# Patient Record
Sex: FEMALE | Race: WHITE | Employment: OTHER | URBAN - NONMETROPOLITAN AREA
[De-identification: names, ages, dates, MRNs, and addresses within clinical notes are randomized per-mention and may not be internally consistent; named-entity substitution may affect disease eponyms.]

---

## 2018-08-22 ENCOUNTER — HOSPITAL ENCOUNTER (OUTPATIENT)
Dept: PREADMISSION TESTING | Age: 63
Discharge: HOME | End: 2018-08-26
Payer: COMMERCIAL

## 2018-08-22 VITALS — HEIGHT: 65 IN | WEIGHT: 155 LBS | BODY MASS INDEX: 25.83 KG/M2

## 2018-08-22 LAB
ANION GAP SERPL CALCULATED.3IONS-SCNC: 12 MMOL/L (ref 7–19)
BASOPHILS ABSOLUTE: 0.1 K/UL (ref 0–0.2)
BASOPHILS RELATIVE PERCENT: 0.7 % (ref 0–1)
BUN BLDV-MCNC: 16 MG/DL (ref 8–23)
CALCIUM SERPL-MCNC: 9.4 MG/DL (ref 8.8–10.2)
CHLORIDE BLD-SCNC: 103 MMOL/L (ref 98–111)
CO2: 27 MMOL/L (ref 22–29)
CREAT SERPL-MCNC: 0.9 MG/DL (ref 0.5–0.9)
EKG P AXIS: 61 DEGREES
EKG P-R INTERVAL: 156 MS
EKG Q-T INTERVAL: 402 MS
EKG QRS DURATION: 96 MS
EKG QTC CALCULATION (BAZETT): 416 MS
EKG T AXIS: 33 DEGREES
EOSINOPHILS ABSOLUTE: 0.1 K/UL (ref 0–0.6)
EOSINOPHILS RELATIVE PERCENT: 1.1 % (ref 0–5)
GFR NON-AFRICAN AMERICAN: >60
GLUCOSE BLD-MCNC: 64 MG/DL (ref 74–109)
HCT VFR BLD CALC: 43.4 % (ref 37–47)
HEMOGLOBIN: 13.5 G/DL (ref 12–16)
LYMPHOCYTES ABSOLUTE: 1.4 K/UL (ref 1.1–4.5)
LYMPHOCYTES RELATIVE PERCENT: 18.1 % (ref 20–40)
MCH RBC QN AUTO: 29.7 PG (ref 27–31)
MCHC RBC AUTO-ENTMCNC: 31.1 G/DL (ref 33–37)
MCV RBC AUTO: 95.4 FL (ref 81–99)
MONOCYTES ABSOLUTE: 0.6 K/UL (ref 0–0.9)
MONOCYTES RELATIVE PERCENT: 8.2 % (ref 0–10)
NEUTROPHILS ABSOLUTE: 5.3 K/UL (ref 1.5–7.5)
NEUTROPHILS RELATIVE PERCENT: 71.4 % (ref 50–65)
PDW BLD-RTO: 14.1 % (ref 11.5–14.5)
PLATELET # BLD: 235 K/UL (ref 130–400)
PMV BLD AUTO: 11.1 FL (ref 9.4–12.3)
POTASSIUM SERPL-SCNC: 4.1 MMOL/L (ref 3.5–5)
RBC # BLD: 4.55 M/UL (ref 4.2–5.4)
SODIUM BLD-SCNC: 142 MMOL/L (ref 136–145)
WBC # BLD: 7.5 K/UL (ref 4.8–10.8)

## 2018-08-22 PROCEDURE — 80048 BASIC METABOLIC PNL TOTAL CA: CPT

## 2018-08-22 PROCEDURE — 93005 ELECTROCARDIOGRAM TRACING: CPT

## 2018-08-22 PROCEDURE — 85025 COMPLETE CBC W/AUTO DIFF WBC: CPT

## 2018-08-22 RX ORDER — FLUTICASONE PROPIONATE 50 MCG
1 SPRAY, SUSPENSION (ML) NASAL DAILY
COMMUNITY
End: 2022-10-20 | Stop reason: ALTCHOICE

## 2018-08-22 RX ORDER — ESTRADIOL 0.5 MG/1
0.5 TABLET ORAL DAILY
COMMUNITY

## 2018-08-22 RX ORDER — SERTRALINE HYDROCHLORIDE 100 MG/1
100 TABLET, FILM COATED ORAL DAILY
Status: ON HOLD | COMMUNITY
End: 2018-08-31

## 2018-08-22 RX ORDER — M-VIT,TX,IRON,MINS/CALC/FOLIC 27MG-0.4MG
1 TABLET ORAL DAILY
COMMUNITY

## 2018-08-22 RX ORDER — OMEPRAZOLE 20 MG/1
20 CAPSULE, DELAYED RELEASE ORAL DAILY
COMMUNITY
End: 2022-10-20 | Stop reason: ALTCHOICE

## 2018-08-22 RX ORDER — LEVOTHYROXINE SODIUM 88 UG/1
88 TABLET ORAL DAILY
COMMUNITY

## 2018-08-22 RX ORDER — RANITIDINE 150 MG/1
150 TABLET ORAL NIGHTLY
COMMUNITY
End: 2022-10-20 | Stop reason: ALTCHOICE

## 2018-08-31 ENCOUNTER — ANESTHESIA (OUTPATIENT)
Dept: OPERATING ROOM | Age: 63
End: 2018-08-31
Payer: COMMERCIAL

## 2018-08-31 ENCOUNTER — HOSPITAL ENCOUNTER (OUTPATIENT)
Age: 63
Setting detail: OUTPATIENT SURGERY
Discharge: HOME | End: 2018-08-31
Attending: ORTHOPAEDIC SURGERY | Admitting: ORTHOPAEDIC SURGERY
Payer: COMMERCIAL

## 2018-08-31 ENCOUNTER — ANESTHESIA EVENT (OUTPATIENT)
Dept: OPERATING ROOM | Age: 63
End: 2018-08-31
Payer: COMMERCIAL

## 2018-08-31 VITALS
OXYGEN SATURATION: 95 % | SYSTOLIC BLOOD PRESSURE: 125 MMHG | HEART RATE: 95 BPM | WEIGHT: 155 LBS | TEMPERATURE: 97 F | RESPIRATION RATE: 16 BRPM | BODY MASS INDEX: 25.83 KG/M2 | DIASTOLIC BLOOD PRESSURE: 63 MMHG | HEIGHT: 65 IN

## 2018-08-31 VITALS
DIASTOLIC BLOOD PRESSURE: 58 MMHG | TEMPERATURE: 96.9 F | OXYGEN SATURATION: 100 % | SYSTOLIC BLOOD PRESSURE: 119 MMHG | RESPIRATION RATE: 13 BRPM

## 2018-08-31 DIAGNOSIS — M75.121 COMPLETE TEAR OF RIGHT ROTATOR CUFF: Primary | ICD-10-CM

## 2018-08-31 PROCEDURE — 6370000000 HC RX 637 (ALT 250 FOR IP): Performed by: ANESTHESIOLOGY

## 2018-08-31 PROCEDURE — 3600000014 HC SURGERY LEVEL 4 ADDTL 15MIN: Performed by: ORTHOPAEDIC SURGERY

## 2018-08-31 PROCEDURE — 6360000002 HC RX W HCPCS: Performed by: NURSE ANESTHETIST, CERTIFIED REGISTERED

## 2018-08-31 PROCEDURE — 7100000011 HC PHASE II RECOVERY - ADDTL 15 MIN: Performed by: ORTHOPAEDIC SURGERY

## 2018-08-31 PROCEDURE — 2580000003 HC RX 258: Performed by: ORTHOPAEDIC SURGERY

## 2018-08-31 PROCEDURE — 3600000004 HC SURGERY LEVEL 4 BASE: Performed by: ORTHOPAEDIC SURGERY

## 2018-08-31 PROCEDURE — 6360000002 HC RX W HCPCS: Performed by: ORTHOPAEDIC SURGERY

## 2018-08-31 PROCEDURE — 2500000003 HC RX 250 WO HCPCS: Performed by: NURSE ANESTHETIST, CERTIFIED REGISTERED

## 2018-08-31 PROCEDURE — 3700000000 HC ANESTHESIA ATTENDED CARE: Performed by: ORTHOPAEDIC SURGERY

## 2018-08-31 PROCEDURE — 64415 NJX AA&/STRD BRCH PLXS IMG: CPT | Performed by: NURSE ANESTHETIST, CERTIFIED REGISTERED

## 2018-08-31 PROCEDURE — L3650 SO 8 ABD RESTRAINT PRE OTS: HCPCS | Performed by: ORTHOPAEDIC SURGERY

## 2018-08-31 PROCEDURE — 7100000010 HC PHASE II RECOVERY - FIRST 15 MIN: Performed by: ORTHOPAEDIC SURGERY

## 2018-08-31 PROCEDURE — 7100000001 HC PACU RECOVERY - ADDTL 15 MIN: Performed by: ORTHOPAEDIC SURGERY

## 2018-08-31 PROCEDURE — 2720000010 HC SURG SUPPLY STERILE: Performed by: ORTHOPAEDIC SURGERY

## 2018-08-31 PROCEDURE — 2709999900 HC NON-CHARGEABLE SUPPLY: Performed by: ORTHOPAEDIC SURGERY

## 2018-08-31 PROCEDURE — C1713 ANCHOR/SCREW BN/BN,TIS/BN: HCPCS | Performed by: ORTHOPAEDIC SURGERY

## 2018-08-31 PROCEDURE — 6360000002 HC RX W HCPCS

## 2018-08-31 PROCEDURE — 6360000002 HC RX W HCPCS: Performed by: ANESTHESIOLOGY

## 2018-08-31 PROCEDURE — 3700000001 HC ADD 15 MINUTES (ANESTHESIA): Performed by: ORTHOPAEDIC SURGERY

## 2018-08-31 PROCEDURE — 7100000000 HC PACU RECOVERY - FIRST 15 MIN: Performed by: ORTHOPAEDIC SURGERY

## 2018-08-31 DEVICE — SYSTEM IMPL INCL 2 4.75MM BIOCOMPOSITE SWIVELOCK C ANCHR: Type: IMPLANTABLE DEVICE | Site: SHOULDER | Status: FUNCTIONAL

## 2018-08-31 RX ORDER — ASPIRIN 325 MG
325 TABLET, DELAYED RELEASE (ENTERIC COATED) ORAL 2 TIMES DAILY
Qty: 42 TABLET | Refills: 0 | Status: ON HOLD | OUTPATIENT
Start: 2018-08-31 | End: 2019-04-16

## 2018-08-31 RX ORDER — MIDAZOLAM HYDROCHLORIDE 1 MG/ML
2 INJECTION INTRAMUSCULAR; INTRAVENOUS
Status: COMPLETED | OUTPATIENT
Start: 2018-08-31 | End: 2018-08-31

## 2018-08-31 RX ORDER — LABETALOL HYDROCHLORIDE 5 MG/ML
5 INJECTION, SOLUTION INTRAVENOUS EVERY 10 MIN PRN
Status: DISCONTINUED | OUTPATIENT
Start: 2018-08-31 | End: 2018-08-31 | Stop reason: HOSPADM

## 2018-08-31 RX ORDER — SCOLOPAMINE TRANSDERMAL SYSTEM 1 MG/1
1 PATCH, EXTENDED RELEASE TRANSDERMAL ONCE
Status: DISCONTINUED | OUTPATIENT
Start: 2018-08-31 | End: 2018-08-31 | Stop reason: HOSPADM

## 2018-08-31 RX ORDER — OXYCODONE HYDROCHLORIDE 5 MG/1
5 TABLET ORAL
Status: DISCONTINUED | OUTPATIENT
Start: 2018-08-31 | End: 2018-08-31 | Stop reason: HOSPADM

## 2018-08-31 RX ORDER — SODIUM CHLORIDE, SODIUM LACTATE, POTASSIUM CHLORIDE, CALCIUM CHLORIDE 600; 310; 30; 20 MG/100ML; MG/100ML; MG/100ML; MG/100ML
INJECTION, SOLUTION INTRAVENOUS CONTINUOUS
Status: DISCONTINUED | OUTPATIENT
Start: 2018-08-31 | End: 2018-08-31 | Stop reason: HOSPADM

## 2018-08-31 RX ORDER — PROMETHAZINE HYDROCHLORIDE 25 MG/ML
6.25 INJECTION, SOLUTION INTRAMUSCULAR; INTRAVENOUS
Status: DISCONTINUED | OUTPATIENT
Start: 2018-08-31 | End: 2018-08-31 | Stop reason: HOSPADM

## 2018-08-31 RX ORDER — ONDANSETRON 4 MG/1
4 TABLET, FILM COATED ORAL DAILY PRN
Qty: 20 TABLET | Refills: 0 | Status: ON HOLD | OUTPATIENT
Start: 2018-08-31 | End: 2019-04-16 | Stop reason: ALTCHOICE

## 2018-08-31 RX ORDER — HYDROMORPHONE HCL IN 0.9% NACL 0.5 MG/ML
0.25 SYRINGE (ML) INTRAVENOUS EVERY 5 MIN PRN
Status: DISCONTINUED | OUTPATIENT
Start: 2018-08-31 | End: 2018-08-31 | Stop reason: HOSPADM

## 2018-08-31 RX ORDER — DEXAMETHASONE SODIUM PHOSPHATE 10 MG/ML
INJECTION INTRAMUSCULAR; INTRAVENOUS PRN
Status: DISCONTINUED | OUTPATIENT
Start: 2018-08-31 | End: 2018-08-31 | Stop reason: SDUPTHER

## 2018-08-31 RX ORDER — APREPITANT 40 MG/1
40 CAPSULE ORAL ONCE
Status: COMPLETED | OUTPATIENT
Start: 2018-08-31 | End: 2018-08-31

## 2018-08-31 RX ORDER — FENTANYL CITRATE 50 UG/ML
INJECTION, SOLUTION INTRAMUSCULAR; INTRAVENOUS PRN
Status: DISCONTINUED | OUTPATIENT
Start: 2018-08-31 | End: 2018-08-31 | Stop reason: SDUPTHER

## 2018-08-31 RX ORDER — MIDAZOLAM HYDROCHLORIDE 1 MG/ML
INJECTION INTRAMUSCULAR; INTRAVENOUS
Status: COMPLETED
Start: 2018-08-31 | End: 2018-08-31

## 2018-08-31 RX ORDER — ROCURONIUM BROMIDE 10 MG/ML
INJECTION, SOLUTION INTRAVENOUS PRN
Status: DISCONTINUED | OUTPATIENT
Start: 2018-08-31 | End: 2018-08-31 | Stop reason: SDUPTHER

## 2018-08-31 RX ORDER — METOCLOPRAMIDE HYDROCHLORIDE 5 MG/ML
10 INJECTION INTRAMUSCULAR; INTRAVENOUS
Status: DISCONTINUED | OUTPATIENT
Start: 2018-08-31 | End: 2018-08-31 | Stop reason: HOSPADM

## 2018-08-31 RX ORDER — HYDROMORPHONE HCL IN 0.9% NACL 0.5 MG/ML
0.5 SYRINGE (ML) INTRAVENOUS EVERY 5 MIN PRN
Status: DISCONTINUED | OUTPATIENT
Start: 2018-08-31 | End: 2018-08-31 | Stop reason: HOSPADM

## 2018-08-31 RX ORDER — EPHEDRINE SULFATE 50 MG/ML
INJECTION, SOLUTION INTRAVENOUS PRN
Status: DISCONTINUED | OUTPATIENT
Start: 2018-08-31 | End: 2018-08-31 | Stop reason: SDUPTHER

## 2018-08-31 RX ORDER — OXYCODONE HYDROCHLORIDE AND ACETAMINOPHEN 5; 325 MG/1; MG/1
1-2 TABLET ORAL EVERY 4 HOURS PRN
Qty: 60 TABLET | Refills: 0 | Status: SHIPPED | OUTPATIENT
Start: 2018-08-31 | End: 2018-09-07

## 2018-08-31 RX ORDER — FENTANYL CITRATE 50 UG/ML
50 INJECTION, SOLUTION INTRAMUSCULAR; INTRAVENOUS
Status: DISCONTINUED | OUTPATIENT
Start: 2018-08-31 | End: 2018-08-31 | Stop reason: HOSPADM

## 2018-08-31 RX ORDER — PROPOFOL 10 MG/ML
INJECTION, EMULSION INTRAVENOUS PRN
Status: DISCONTINUED | OUTPATIENT
Start: 2018-08-31 | End: 2018-08-31 | Stop reason: SDUPTHER

## 2018-08-31 RX ORDER — DIPHENHYDRAMINE HYDROCHLORIDE 50 MG/ML
12.5 INJECTION INTRAMUSCULAR; INTRAVENOUS
Status: DISCONTINUED | OUTPATIENT
Start: 2018-08-31 | End: 2018-08-31 | Stop reason: HOSPADM

## 2018-08-31 RX ORDER — DOCUSATE SODIUM 100 MG/1
100 CAPSULE, LIQUID FILLED ORAL 2 TIMES DAILY
Qty: 60 CAPSULE | Refills: 1 | Status: ON HOLD | OUTPATIENT
Start: 2018-08-31 | End: 2019-04-16 | Stop reason: ALTCHOICE

## 2018-08-31 RX ORDER — MEPERIDINE HYDROCHLORIDE 50 MG/ML
12.5 INJECTION INTRAMUSCULAR; INTRAVENOUS; SUBCUTANEOUS EVERY 5 MIN PRN
Status: DISCONTINUED | OUTPATIENT
Start: 2018-08-31 | End: 2018-08-31 | Stop reason: HOSPADM

## 2018-08-31 RX ORDER — HYDRALAZINE HYDROCHLORIDE 20 MG/ML
5 INJECTION INTRAMUSCULAR; INTRAVENOUS EVERY 10 MIN PRN
Status: DISCONTINUED | OUTPATIENT
Start: 2018-08-31 | End: 2018-08-31 | Stop reason: HOSPADM

## 2018-08-31 RX ORDER — LIDOCAINE HYDROCHLORIDE 10 MG/ML
1 INJECTION, SOLUTION EPIDURAL; INFILTRATION; INTRACAUDAL; PERINEURAL
Status: DISCONTINUED | OUTPATIENT
Start: 2018-08-31 | End: 2018-08-31 | Stop reason: HOSPADM

## 2018-08-31 RX ORDER — SODIUM CHLORIDE 0.9 % (FLUSH) 0.9 %
10 SYRINGE (ML) INJECTION EVERY 12 HOURS SCHEDULED
Status: DISCONTINUED | OUTPATIENT
Start: 2018-08-31 | End: 2018-08-31 | Stop reason: HOSPADM

## 2018-08-31 RX ORDER — LIDOCAINE HYDROCHLORIDE 10 MG/ML
INJECTION, SOLUTION EPIDURAL; INFILTRATION; INTRACAUDAL; PERINEURAL PRN
Status: DISCONTINUED | OUTPATIENT
Start: 2018-08-31 | End: 2018-08-31 | Stop reason: SDUPTHER

## 2018-08-31 RX ORDER — MORPHINE SULFATE/0.9% NACL/PF 1 MG/ML
2 SYRINGE (ML) INJECTION EVERY 5 MIN PRN
Status: DISCONTINUED | OUTPATIENT
Start: 2018-08-31 | End: 2018-08-31 | Stop reason: HOSPADM

## 2018-08-31 RX ORDER — SODIUM CHLORIDE 0.9 % (FLUSH) 0.9 %
10 SYRINGE (ML) INJECTION PRN
Status: DISCONTINUED | OUTPATIENT
Start: 2018-08-31 | End: 2018-08-31 | Stop reason: HOSPADM

## 2018-08-31 RX ORDER — ONDANSETRON 2 MG/ML
INJECTION INTRAMUSCULAR; INTRAVENOUS PRN
Status: DISCONTINUED | OUTPATIENT
Start: 2018-08-31 | End: 2018-08-31 | Stop reason: SDUPTHER

## 2018-08-31 RX ORDER — MORPHINE SULFATE/0.9% NACL/PF 1 MG/ML
4 SYRINGE (ML) INJECTION EVERY 5 MIN PRN
Status: DISCONTINUED | OUTPATIENT
Start: 2018-08-31 | End: 2018-08-31 | Stop reason: HOSPADM

## 2018-08-31 RX ORDER — MORPHINE SULFATE 4 MG/ML
4 INJECTION, SOLUTION INTRAMUSCULAR; INTRAVENOUS
Status: DISCONTINUED | OUTPATIENT
Start: 2018-08-31 | End: 2018-08-31 | Stop reason: HOSPADM

## 2018-08-31 RX ADMIN — EPHEDRINE SULFATE 20 MG: 50 INJECTION, SOLUTION INTRAMUSCULAR; INTRAVENOUS; SUBCUTANEOUS at 08:19

## 2018-08-31 RX ADMIN — WATER 1 G: 1 INJECTION INTRAMUSCULAR; INTRAVENOUS; SUBCUTANEOUS at 08:23

## 2018-08-31 RX ADMIN — LIDOCAINE HYDROCHLORIDE 5 ML: 10 INJECTION, SOLUTION EPIDURAL; INFILTRATION; INTRACAUDAL; PERINEURAL at 08:08

## 2018-08-31 RX ADMIN — ROCURONIUM BROMIDE 40 MG: 10 INJECTION INTRAVENOUS at 08:08

## 2018-08-31 RX ADMIN — MIDAZOLAM HYDROCHLORIDE 2 MG: 2 INJECTION, SOLUTION INTRAMUSCULAR; INTRAVENOUS at 07:58

## 2018-08-31 RX ADMIN — PHENYLEPHRINE HYDROCHLORIDE 100 MCG: 10 INJECTION INTRAVENOUS at 08:19

## 2018-08-31 RX ADMIN — SODIUM CHLORIDE, POTASSIUM CHLORIDE, SODIUM LACTATE AND CALCIUM CHLORIDE: 600; 310; 30; 20 INJECTION, SOLUTION INTRAVENOUS at 07:10

## 2018-08-31 RX ADMIN — APREPITANT 40 MG: 40 CAPSULE ORAL at 07:09

## 2018-08-31 RX ADMIN — MIDAZOLAM HYDROCHLORIDE 2 MG: 1 INJECTION INTRAMUSCULAR; INTRAVENOUS at 07:58

## 2018-08-31 RX ADMIN — DEXAMETHASONE SODIUM PHOSPHATE 10 MG: 10 INJECTION INTRAMUSCULAR; INTRAVENOUS at 08:29

## 2018-08-31 RX ADMIN — PROPOFOL 150 MG: 10 INJECTION, EMULSION INTRAVENOUS at 08:08

## 2018-08-31 RX ADMIN — ONDANSETRON HYDROCHLORIDE 4 MG: 2 INJECTION, SOLUTION INTRAMUSCULAR; INTRAVENOUS at 08:29

## 2018-08-31 RX ADMIN — SUGAMMADEX 150 MG: 100 INJECTION, SOLUTION INTRAVENOUS at 09:51

## 2018-08-31 RX ADMIN — FENTANYL CITRATE 100 MCG: 50 INJECTION INTRAMUSCULAR; INTRAVENOUS at 08:08

## 2018-08-31 ASSESSMENT — PAIN DESCRIPTION - ORIENTATION: ORIENTATION: RIGHT

## 2018-08-31 ASSESSMENT — ENCOUNTER SYMPTOMS: SHORTNESS OF BREATH: 1

## 2018-08-31 ASSESSMENT — PAIN DESCRIPTION - LOCATION: LOCATION: SHOULDER

## 2018-08-31 ASSESSMENT — PAIN SCALES - GENERAL
PAINLEVEL_OUTOF10: 0

## 2018-08-31 ASSESSMENT — PAIN DESCRIPTION - PAIN TYPE: TYPE: SURGICAL PAIN

## 2018-08-31 ASSESSMENT — LIFESTYLE VARIABLES: SMOKING_STATUS: 0

## 2018-08-31 NOTE — ANESTHESIA POSTPROCEDURE EVALUATION
Department of Anesthesiology  Postprocedure Note    Patient: Melvi Sullivan  MRN: [id number]  YOB: 1955  Date of evaluation: 8/31/2018  Time:  10:07 AM     Procedure Summary     Date:  08/31/18 Room / Location:  [facility] OR  / [facility] OR    Anesthesia Start:  0805 Anesthesia Stop:  9267    Procedure:  SHOULDER ARTHROSCOPY ROTATOR CUFF REPAIR SUBACROMIAL DECOMPRESSION ACROMIOPLASTY DISTAL CLAVICLE EXCISION (Right Shoulder) Diagnosis:  (M75.121)    Surgeon:  Sanford Macdonald MD Responsible Provider:  MAY Grimaldo CRNA    Anesthesia Type:  general ASA Status:  3          Anesthesia Type: general    Reggie Phase I: Reggie Score: 10    Reggie Phase II:      Last vitals: Reviewed and per EMR flowsheets.        Anesthesia Post Evaluation    Patient location during evaluation: PACU  Patient participation: complete - patient participated  Level of consciousness: awake and alert  Pain score: 0  Airway patency: patent  Nausea & Vomiting: no vomiting and no nausea  Complications: no  Cardiovascular status: hemodynamically stable  Respiratory status: acceptable and room air  Hydration status: stable

## 2018-08-31 NOTE — OP NOTE
Patient Name: Fern Murphy  : 1955  MRN: [id number]    DATE of SURGERY: 2018    SURGEON: Tiffany Vazquez MD    ASSISTANT: NONE    PREOPERATIVE DIAGNOSIS:  1) recent atraumatic complete rotator cuff tear of the Right shoulder  2) Proximal biceps tendinitis, Right Shoulder    3) Subacromial impingement syndrome, Right Shoulder   4) Acromioclavicular joint primary osteoarthritis, Right Shoulder     POSTOPERATIVE DIAGNOSIS:  1) recent atraumatic complete rotator cuff tear of the Right shoulder  2) Proximal biceps tendinitis with labral fraying, Right Shoulder    3) Subacromial impingement syndrome, Right Shoulder   4) Acromioclavicular joint primary osteoarthritis, Right Shoulder     PROCEDURE PERFORMED:  1) Right Shoulder arthroscopic rotator cuff repair  2) Right Shoulder arthroscopic biceps tenotomy with labral debridement  3) Right Shoulder arthroscopic subacromial decompression  4) Right Shoulder arthroscopic distal clavicle excision    IMPLANTS: Arthrex 4.75 mm bioswivelock anchor    ANESTHESIA USED: General endotrachial anesthesia, interscalene block    ESTIMATED BLOOD LOSS: minimal    DRAINS: none     COMPLICATIONS: none    SPECIMENS: none    OPERATIVE INDICATIONS: This patient is a 60 y.o. female presented to my clinic with complaints of progressive shoulder pain without obvious traumatic injury to the shoulder. An MRI was obtained which showed the above named diagnoses. Pain was not relieved with conservative treatment consisting of anti-inflammatories, corticosteroid injections, physical therapy. Due to progressive pain and loss of function, surgical evaluation was discussed and the patient wished to proceed understanding risks, benefits, and alternatives. The surgical indication were to relieve pain, improve function, and prevent future disability in regards to the shoulder pathology dictated in the aboved diagnoses.   Risks included, but were not limited to, that of anesthesia, bleeding, infection, posterior anchor were then retrieved out the lateral portal and fixation was performed in knotless fashion the lateral aspect of the greater tuberosity with a knotless 4.75 bioswivelock anchor. An identical procedure was performed for the remaining sutures completing the repair. Portals were then closed with monocryl and a sterile dressing was applied followed by a sling. The patient was awakened from anesthesia, transported to the recovery room in stable condition.     POSTOP PLAN:    1) Discharge home with family  2) Follow up in 2 weeks for a clinical check  3) Follow the following protocol: Small/Medium RCR      Electronically signed by Андрей Calvert MD on 8/31/2018 at 8:19 AM

## 2018-08-31 NOTE — ANESTHESIA PRE PROCEDURE
Department of Anesthesiology  Preprocedure Note       Name:  Tonia Rodríguez   Age:  60 y.o.  :  1955                                          MRN:  [id number]         Date:  2018      Surgeon: Jillian Dickson):  Meryl López MD    Procedure: Procedure(s):  SHOULDER ARTHROSCOPY ROTATOR CUFF REPAIR SUBACROMIAL DECOMPRESSION ACROMIOPLASTY DISTAL CLAVICLE EXCISION    Medications prior to admission:   Prior to Admission medications    Medication Sig Start Date End Date Taking? Authorizing Provider   levothyroxine (SYNTHROID) 88 MCG tablet Take 88 mcg by mouth Daily    Historical Provider, MD   sertraline (ZOLOFT) 100 MG tablet Take 100 mg by mouth daily    Historical Provider, MD   estradiol (ESTRACE) 0.5 MG tablet Take 0.5 mg by mouth daily    Historical Provider, MD   omeprazole (PRILOSEC) 20 MG delayed release capsule Take 20 mg by mouth daily    Historical Provider, MD   ranitidine (ZANTAC) 150 MG tablet Take 150 mg by mouth nightly    Historical Provider, MD   Multiple Vitamins-Minerals (THERAPEUTIC MULTIVITAMIN-MINERALS) tablet Take 1 tablet by mouth daily    Historical Provider, MD   fluticasone (FLONASE) 50 MCG/ACT nasal spray 1 spray by Each Nare route daily    Historical Provider, MD       Current medications:    Current Facility-Administered Medications   Medication Dose Route Frequency Provider Last Rate Last Dose    ceFAZolin (ANCEF) 1 g in sterile water 10 mL IV syringe  1 g Intravenous Once Meryl López MD        lactated ringers infusion   Intravenous Continuous Meryl López MD           Allergies:     Allergies   Allergen Reactions    Dye [Iodides]      \"it will ruin my only kidney\"      Codeine Nausea And Vomiting       Problem List:    Patient Active Problem List   Diagnosis Code    Chest pain R07.9    SOB (shortness of breath) R06.02       Past Medical History:        Diagnosis Date    Anxiety     Arthritis     Chest pain 10/12/2011    hx. of    GERD (gastroesophageal reflux disease)    

## 2019-04-06 ENCOUNTER — TRANSCRIBE ORDERS (OUTPATIENT)
Dept: ADMINISTRATIVE | Facility: HOSPITAL | Age: 64
End: 2019-04-06

## 2019-04-06 DIAGNOSIS — Z01.812 PRE-OPERATIVE LABORATORY EXAMINATION: ICD-10-CM

## 2019-04-06 DIAGNOSIS — Z01.818 PREOP EXAMINATION: Primary | ICD-10-CM

## 2019-04-08 ENCOUNTER — APPOINTMENT (OUTPATIENT)
Dept: LAB | Facility: HOSPITAL | Age: 64
End: 2019-04-08

## 2019-04-08 ENCOUNTER — HOSPITAL ENCOUNTER (OUTPATIENT)
Dept: CARDIOLOGY | Facility: HOSPITAL | Age: 64
Discharge: HOME | End: 2019-04-08
Admitting: NURSE ANESTHETIST, CERTIFIED REGISTERED

## 2019-04-08 DIAGNOSIS — Z01.818 PREOP EXAMINATION: ICD-10-CM

## 2019-04-08 LAB
ALBUMIN SERPL-MCNC: 4.5 G/DL (ref 3.5–5)
ALBUMIN/GLOB SERPL: 1.6 G/DL (ref 1.1–2.5)
ALP SERPL-CCNC: 78 U/L (ref 24–120)
ALT SERPL W P-5'-P-CCNC: <15 U/L (ref 0–54)
ANION GAP SERPL CALCULATED.3IONS-SCNC: 9 MMOL/L (ref 4–13)
AST SERPL-CCNC: 26 U/L (ref 7–45)
BILIRUB SERPL-MCNC: 0.4 MG/DL (ref 0.1–1)
BUN BLD-MCNC: 18 MG/DL (ref 5–21)
BUN/CREAT SERPL: 18.8 (ref 7–25)
CALCIUM SPEC-SCNC: 9.6 MG/DL (ref 8.4–10.4)
CHLORIDE SERPL-SCNC: 105 MMOL/L (ref 98–110)
CO2 SERPL-SCNC: 29 MMOL/L (ref 24–31)
CREAT BLD-MCNC: 0.96 MG/DL (ref 0.5–1.4)
GFR SERPL CREATININE-BSD FRML MDRD: 59 ML/MIN/1.73
GLOBULIN UR ELPH-MCNC: 2.9 GM/DL
GLUCOSE BLD-MCNC: 88 MG/DL (ref 70–100)
POTASSIUM BLD-SCNC: 4.1 MMOL/L (ref 3.5–5.3)
PROT SERPL-MCNC: 7.4 G/DL (ref 6.3–8.7)
SODIUM BLD-SCNC: 143 MMOL/L (ref 135–145)

## 2019-04-08 PROCEDURE — 36415 COLL VENOUS BLD VENIPUNCTURE: CPT | Performed by: NURSE ANESTHETIST, CERTIFIED REGISTERED

## 2019-04-08 PROCEDURE — 93005 ELECTROCARDIOGRAM TRACING: CPT | Performed by: NURSE ANESTHETIST, CERTIFIED REGISTERED

## 2019-04-08 PROCEDURE — 80053 COMPREHEN METABOLIC PANEL: CPT | Performed by: NURSE ANESTHETIST, CERTIFIED REGISTERED

## 2019-04-08 PROCEDURE — 93010 ELECTROCARDIOGRAM REPORT: CPT | Performed by: INTERNAL MEDICINE

## 2019-04-15 ENCOUNTER — ANESTHESIA EVENT (OUTPATIENT)
Dept: OPERATING ROOM | Age: 64
End: 2019-04-15

## 2019-04-16 ENCOUNTER — HOSPITAL ENCOUNTER (OUTPATIENT)
Age: 64
Setting detail: OUTPATIENT SURGERY
Discharge: HOME | End: 2019-04-16
Attending: ORTHOPAEDIC SURGERY | Admitting: ORTHOPAEDIC SURGERY

## 2019-04-16 ENCOUNTER — ANESTHESIA (OUTPATIENT)
Dept: OPERATING ROOM | Age: 64
End: 2019-04-16

## 2019-04-16 VITALS
RESPIRATION RATE: 2 BRPM | SYSTOLIC BLOOD PRESSURE: 172 MMHG | DIASTOLIC BLOOD PRESSURE: 87 MMHG | OXYGEN SATURATION: 100 %

## 2019-04-16 VITALS
TEMPERATURE: 97.1 F | BODY MASS INDEX: 27.49 KG/M2 | WEIGHT: 165 LBS | SYSTOLIC BLOOD PRESSURE: 110 MMHG | OXYGEN SATURATION: 95 % | HEIGHT: 65 IN | HEART RATE: 84 BPM | RESPIRATION RATE: 16 BRPM | DIASTOLIC BLOOD PRESSURE: 50 MMHG

## 2019-04-16 DIAGNOSIS — M18.11 ARTHRITIS OF CARPOMETACARPAL (CMC) JOINT OF RIGHT THUMB: Primary | ICD-10-CM

## 2019-04-16 PROCEDURE — G8916 PT W IV AB GIVEN ON TIME: HCPCS

## 2019-04-16 PROCEDURE — G8907 PT DOC NO EVENTS ON DISCHARG: HCPCS

## 2019-04-16 PROCEDURE — 25447 ARTHRP NTRCRP/CRP/MTCR NTRPS: CPT

## 2019-04-16 RX ORDER — FENTANYL CITRATE 50 UG/ML
INJECTION, SOLUTION INTRAMUSCULAR; INTRAVENOUS PRN
Status: DISCONTINUED | OUTPATIENT
Start: 2019-04-16 | End: 2019-04-16 | Stop reason: SDUPTHER

## 2019-04-16 RX ORDER — HYDRALAZINE HYDROCHLORIDE 20 MG/ML
5 INJECTION INTRAMUSCULAR; INTRAVENOUS EVERY 10 MIN PRN
Status: DISCONTINUED | OUTPATIENT
Start: 2019-04-16 | End: 2019-04-16 | Stop reason: HOSPADM

## 2019-04-16 RX ORDER — SODIUM CHLORIDE, SODIUM LACTATE, POTASSIUM CHLORIDE, CALCIUM CHLORIDE 600; 310; 30; 20 MG/100ML; MG/100ML; MG/100ML; MG/100ML
INJECTION, SOLUTION INTRAVENOUS CONTINUOUS
Status: DISCONTINUED | OUTPATIENT
Start: 2019-04-16 | End: 2019-04-16 | Stop reason: HOSPADM

## 2019-04-16 RX ORDER — SERTRALINE HYDROCHLORIDE 100 MG/1
25 TABLET, FILM COATED ORAL DAILY
COMMUNITY
End: 2022-10-20 | Stop reason: DRUGHIGH

## 2019-04-16 RX ORDER — PROPOFOL 10 MG/ML
INJECTION, EMULSION INTRAVENOUS PRN
Status: DISCONTINUED | OUTPATIENT
Start: 2019-04-16 | End: 2019-04-16 | Stop reason: SDUPTHER

## 2019-04-16 RX ORDER — HYDROMORPHONE HCL 110MG/55ML
0.5 PATIENT CONTROLLED ANALGESIA SYRINGE INTRAVENOUS EVERY 5 MIN PRN
Status: DISCONTINUED | OUTPATIENT
Start: 2019-04-16 | End: 2019-04-16 | Stop reason: HOSPADM

## 2019-04-16 RX ORDER — MIDAZOLAM HYDROCHLORIDE 1 MG/ML
INJECTION INTRAMUSCULAR; INTRAVENOUS PRN
Status: DISCONTINUED | OUTPATIENT
Start: 2019-04-16 | End: 2019-04-16 | Stop reason: SDUPTHER

## 2019-04-16 RX ORDER — OXYCODONE HYDROCHLORIDE AND ACETAMINOPHEN 5; 325 MG/1; MG/1
2 TABLET ORAL PRN
Status: DISCONTINUED | OUTPATIENT
Start: 2019-04-16 | End: 2019-04-16 | Stop reason: HOSPADM

## 2019-04-16 RX ORDER — DIPHENHYDRAMINE HYDROCHLORIDE 50 MG/ML
12.5 INJECTION INTRAMUSCULAR; INTRAVENOUS
Status: DISCONTINUED | OUTPATIENT
Start: 2019-04-16 | End: 2019-04-16 | Stop reason: HOSPADM

## 2019-04-16 RX ORDER — ONDANSETRON 2 MG/ML
4 INJECTION INTRAMUSCULAR; INTRAVENOUS
Status: DISCONTINUED | OUTPATIENT
Start: 2019-04-16 | End: 2019-04-16 | Stop reason: HOSPADM

## 2019-04-16 RX ORDER — MEPERIDINE HYDROCHLORIDE 25 MG/ML
12.5 INJECTION INTRAMUSCULAR; INTRAVENOUS; SUBCUTANEOUS EVERY 5 MIN PRN
Status: DISCONTINUED | OUTPATIENT
Start: 2019-04-16 | End: 2019-04-16 | Stop reason: HOSPADM

## 2019-04-16 RX ORDER — ONDANSETRON 2 MG/ML
INJECTION INTRAMUSCULAR; INTRAVENOUS PRN
Status: DISCONTINUED | OUTPATIENT
Start: 2019-04-16 | End: 2019-04-16 | Stop reason: SDUPTHER

## 2019-04-16 RX ORDER — DEXAMETHASONE SODIUM PHOSPHATE 4 MG/ML
INJECTION, SOLUTION INTRA-ARTICULAR; INTRALESIONAL; INTRAMUSCULAR; INTRAVENOUS; SOFT TISSUE PRN
Status: DISCONTINUED | OUTPATIENT
Start: 2019-04-16 | End: 2019-04-16 | Stop reason: SDUPTHER

## 2019-04-16 RX ORDER — PROMETHAZINE HYDROCHLORIDE 25 MG/ML
12.5 INJECTION, SOLUTION INTRAMUSCULAR; INTRAVENOUS
Status: DISCONTINUED | OUTPATIENT
Start: 2019-04-16 | End: 2019-04-16 | Stop reason: HOSPADM

## 2019-04-16 RX ORDER — FENTANYL CITRATE 50 UG/ML
50 INJECTION, SOLUTION INTRAMUSCULAR; INTRAVENOUS EVERY 5 MIN PRN
Status: DISCONTINUED | OUTPATIENT
Start: 2019-04-16 | End: 2019-04-16 | Stop reason: HOSPADM

## 2019-04-16 RX ORDER — OXYCODONE HYDROCHLORIDE AND ACETAMINOPHEN 5; 325 MG/1; MG/1
1 TABLET ORAL EVERY 6 HOURS PRN
Qty: 25 TABLET | Refills: 0 | Status: SHIPPED | OUTPATIENT
Start: 2019-04-16 | End: 2019-04-23

## 2019-04-16 RX ORDER — HYDROMORPHONE HCL 110MG/55ML
0.25 PATIENT CONTROLLED ANALGESIA SYRINGE INTRAVENOUS EVERY 5 MIN PRN
Status: DISCONTINUED | OUTPATIENT
Start: 2019-04-16 | End: 2019-04-16 | Stop reason: HOSPADM

## 2019-04-16 RX ORDER — LABETALOL HYDROCHLORIDE 5 MG/ML
5 INJECTION, SOLUTION INTRAVENOUS EVERY 10 MIN PRN
Status: DISCONTINUED | OUTPATIENT
Start: 2019-04-16 | End: 2019-04-16 | Stop reason: HOSPADM

## 2019-04-16 RX ORDER — OXYCODONE HYDROCHLORIDE AND ACETAMINOPHEN 5; 325 MG/1; MG/1
1 TABLET ORAL PRN
Status: DISCONTINUED | OUTPATIENT
Start: 2019-04-16 | End: 2019-04-16 | Stop reason: HOSPADM

## 2019-04-16 RX ORDER — ONDANSETRON 4 MG/1
8 TABLET, FILM COATED ORAL
Status: CANCELLED | OUTPATIENT
Start: 2019-04-16 | End: 2019-04-16

## 2019-04-16 RX ADMIN — SODIUM CHLORIDE, SODIUM LACTATE, POTASSIUM CHLORIDE, CALCIUM CHLORIDE: 600; 310; 30; 20 INJECTION, SOLUTION INTRAVENOUS at 11:10

## 2019-04-16 RX ADMIN — FENTANYL CITRATE 25 MCG: 50 INJECTION, SOLUTION INTRAMUSCULAR; INTRAVENOUS at 12:39

## 2019-04-16 RX ADMIN — FENTANYL CITRATE 50 MCG: 50 INJECTION, SOLUTION INTRAMUSCULAR; INTRAVENOUS at 14:20

## 2019-04-16 RX ADMIN — DEXAMETHASONE SODIUM PHOSPHATE 4 MG: 4 INJECTION, SOLUTION INTRA-ARTICULAR; INTRALESIONAL; INTRAMUSCULAR; INTRAVENOUS; SOFT TISSUE at 12:44

## 2019-04-16 RX ADMIN — FENTANYL CITRATE 50 MCG: 50 INJECTION, SOLUTION INTRAMUSCULAR; INTRAVENOUS at 12:00

## 2019-04-16 RX ADMIN — ONDANSETRON 4 MG: 2 INJECTION INTRAMUSCULAR; INTRAVENOUS at 12:44

## 2019-04-16 RX ADMIN — MIDAZOLAM HYDROCHLORIDE 1 MG: 1 INJECTION INTRAMUSCULAR; INTRAVENOUS at 12:00

## 2019-04-16 RX ADMIN — MEPERIDINE HYDROCHLORIDE 12.5 MG: 25 INJECTION INTRAMUSCULAR; INTRAVENOUS; SUBCUTANEOUS at 14:33

## 2019-04-16 RX ADMIN — SODIUM CHLORIDE, SODIUM LACTATE, POTASSIUM CHLORIDE, CALCIUM CHLORIDE: 600; 310; 30; 20 INJECTION, SOLUTION INTRAVENOUS at 13:24

## 2019-04-16 RX ADMIN — PROPOFOL 100 MG: 10 INJECTION, EMULSION INTRAVENOUS at 12:05

## 2019-04-16 RX ADMIN — PROPOFOL 50 MG: 10 INJECTION, EMULSION INTRAVENOUS at 12:04

## 2019-04-16 ASSESSMENT — PAIN SCALES - GENERAL
PAINLEVEL_OUTOF10: 1
PAINLEVEL_OUTOF10: 10

## 2019-04-16 ASSESSMENT — ENCOUNTER SYMPTOMS: SHORTNESS OF BREATH: 1

## 2019-04-16 NOTE — OP NOTE
Patient Name: Vitaly Lino  : 1955  MRN: [id number]    DATE of SURGERY: 2019    SURGEON: Fuentes Goldberg MD    ASSISTANT: None    PREOPERATIVE DIAGNOSES: Right thumb CMC osteoarthritis      POSTOPERATIVE DIAGNOSES: Right thumb CMC osteoarthritis     PROCEDURE PERFORMED: Right CMC arthroplasty       IMPLANTS  None. ANESTHESIA    General endotracheal anesthesia with regional block. OPERATIVE INDICATIONS    This patient is 58 y.o. female who was evaluated and treated in the clinic setting for right thumb CMC osteoarthritis. Unfortunately, conservative measures failed and they elected to proceed with operative intervention. Risks, benefits and alternatives were discussed. Risks including, but not limited to, bleeding, infection, numbness, nerve injury, failure to alleviate any or all of preoperative symptoms, thumb metacarpal subsidence, tendinitis,  weakness, pain and stiffness were discussed. Written and informed consent were obtained. ESTIMATED BLOOD LOSS    20 mL. SPECIMENS  None. DRAINS  None. COMPLICATIONS    No intra-operative complications. PROCEDURE IN DETAIL  The patient was met in the preoperative holding area where the correct patient, procedure and side were confirmed. The operative site was marked by myself. She was then transported to the operating room, placed supine on operating room table and secured to the table with all bony prominences padded. A formal timeout was then held in which operating surgeon and operating team, again, identify the correct patient, procedure and side. General anesthesia was then induced. A nonsterile tourniquet was placed about the right brachium. The right upper extremity was then prepped and draped in normal sterile fashion. A skin marker was used to delineate a [place] approach to the right [facility] joint.   The right upper extremity was then exsanguinated with an Esmarch and the tourniquet was inflated to 250 mmHg for approximately 1 hour and 15 minutes. A 15 blade scalpel was used to incise only through the skin. Blunt scissor dissection was carried to subcutaneous tissue and branches of the superficial radial nerve were identified, protected and retracted throughout the procedure. The thenar musculature fascia was then incised in the musculature was elevated off of the CMC capsule ulnarly. The APL tendon was then identified dorsally at its insertion into the first metacarpal base. A longitudinal capsulotomy was made volar to the APL tendon insertion and the [facility] joint was identified. Sofi Galla was placed within the [facility] joint and C-arm fluoroscopy was brought in to confirm adequate anatomic location which was correct. The [facility] joint was inspected and found to have diffuse grade 4 chondromalacia. Using sharp dissection, the capsule was then elevated off of the trapezium circumferentially with care taken to protect the dorsal vasculature and FCR tendon volarly. Using a combination of sharp dissection, [place] elevator and a rongeur, the trapezium was removed uneventfully. The FCR tendon was identified and the basal wound and found to be intact. The wound was irrigated with normal saline to remove any small bony fragments. After irrigation, the scaphotrapezoid joint was inspected and found to have significant grade 4 degenerative chondromalacia changes. Therefore, I decided to proceed with resection of approximately 3 mm of the proximal trapezoid with a oscillating saw. This was performed uneventfully. The proximal trapezoid fragment was removed and the wound was again irrigated. At this point, longitudinal traction was placed through the thumb metacarpal and a 2-0 FiberWire suture was used to create a suture suspension plasty through the APL and FCR tendons.   After creating the hammock, in addition to longitudinal traction, ulnar pressure over the base of the thumb metacarpal in an adduction moment was applied while the suture was tied. On gross inspection, adequate suspension was obtained. With axial loading through the thumb, there was no appreciable significant subsidence of the metacarpal.  No bony abutment between the intercarpal base and the scaphoid. C-arm fluoroscopy was brought back in and confirmed adequate suspension of the thumb metacarpal.  The capsule was then closed with figure-of-eight 3-0 Vicryl sutures. The thenar fascia was closed with figure-of-eight 4-0 Vicryl sutures. The tourniquet was then deflated and hemostasis was obtained with electrocautery. The wound was irrigated again with normal saline. The subcutaneous tissues were reapproximated with buried 3-0 Vicryl sutures. The skin edges were reapproximated with a running Monocryl suture. A thumb spica splint was applied consisting of Xeroform, 4 x 4's, web roll, plaster and an Ace bandage. General anesthesia was reversed, patient was transferred to the hospital bed and moved to PACU in stable condition. All counts at the end the procedure were correct. Patient tolerated the procedure well and was without intraoperative complication. In PACU, patient will receive a regional block for post-operative pain control. POSTOPERATIVE PLAN    1. Discharged home with family. 2.  Follow up in 2 weeks for a clinical check with plans to transition into a removable thumb spica brace. 3.  Elevate operative extremity. Ok for gentle hand/finger ROM. 4.  Pain control  5. Keep splint in place until follow-up. 6.  We will plan to begin active and passive range of motion exercises 4 weeks postop with strengthening at 6 weeks. Begin to transition out of the brace at the base with released to full activity 3 months.

## 2019-04-16 NOTE — ANESTHESIA PRE PROCEDURE
Department of Anesthesiology  Preprocedure Note       Name:  Stephen Batista   Age:  58 y.o.  :  1955                                          MRN:  [id number]         Date:  2019      Surgeon: Gardenia Perdomo):  Robi Quevedo MD    Procedure: RIGHT THUMB CMC ARTHROPLASTY (Right Thumb)    Medications prior to admission:   Prior to Admission medications    Medication Sig Start Date End Date Taking? Authorizing Provider   sertraline (ZOLOFT) 100 MG tablet Take 150 mg by mouth daily   Yes Historical Provider, MD   levothyroxine (SYNTHROID) 88 MCG tablet Take 88 mcg by mouth Daily   Yes Historical Provider, MD   omeprazole (PRILOSEC) 20 MG delayed release capsule Take 20 mg by mouth Daily    Yes Historical Provider, MD   estradiol (ESTRACE) 0.5 MG tablet Take 0.5 mg by mouth daily    Historical Provider, MD   ranitidine (ZANTAC) 150 MG tablet Take 150 mg by mouth nightly    Historical Provider, MD   Multiple Vitamins-Minerals (THERAPEUTIC MULTIVITAMIN-MINERALS) tablet Take 1 tablet by mouth daily    Historical Provider, MD   fluticasone (FLONASE) 50 MCG/ACT nasal spray 1 spray by Each Nare route daily    Historical Provider, MD       Current medications:    Current Facility-Administered Medications   Medication Dose Route Frequency Provider Last Rate Last Dose    lactated ringers infusion   Intravenous Continuous MAY Hathaway CRNA 125 mL/hr at 19 1110      ceFAZolin (ANCEF) 1 g in dextrose 5 % 100 mL IVPB  1 g Intravenous Once Robi Quevedo MD           Allergies:     Allergies   Allergen Reactions    Dye [Iodides]      \"it will ruin my only kidney\"      Codeine Nausea And Vomiting       Problem List:    Patient Active Problem List   Diagnosis Code    Chest pain R07.9    SOB (shortness of breath) R06.02    Complete tear of right rotator cuff M75.121       Past Medical History:        Diagnosis Date    Anxiety     Arthritis     Chest pain 10/12/2011    hx. of    CPAP (continuous positive airway pressure) dependence     GERD (gastroesophageal reflux disease)     S/P cardiac catheterization 10/12/11    Seasonal allergies     Shoulder pain     Sleep apnea     SOB (shortness of breath) 10/12/2011    hx. of    Thumb pain     raymond.  Thyroid disease        Past Surgical History:        Procedure Laterality Date    BREAST SURGERY Bilateral     CARDIAC CATHETERIZATION      wnl    FOOT SURGERY Bilateral     right bunionectomy; left bunionectomy and bone grafts    HERNIA REPAIR      HYSTERECTOMY      KNEE SURGERY Left     MO SHLDR ARTHROSCOP,SURG,W/ROTAT CUFF REPR Right 8/31/2018    SHOULDER ARTHROSCOPY ROTATOR CUFF REPAIR SUBACROMIAL DECOMPRESSION ACROMIOPLASTY DISTAL CLAVICLE EXCISION performed by Connie Rust MD at [address] Left 2006    donated to spouse.  TUBAL LIGATION         Social History:    Social History     Tobacco Use    Smoking status: Never Smoker    Smokeless tobacco: Never Used   Substance Use Topics    Alcohol use: No                                Counseling given: Not Answered      Vital Signs (Current):   Vitals:    04/16/19 1103   BP: (!) 146/89   Pulse: 64   Resp: 20   Temp: 97.5 °F (36.4 °C)   TempSrc: Temporal   SpO2: 98%   Weight: 165 lb (74.8 kg)   Height: 5' 5\" (1.651 m)                                              BP Readings from Last 3 Encounters:   04/16/19 (!) 146/89   08/31/18 125/63   08/31/18 (!) 119/58       NPO Status: Time of last liquid consumption: 2200                        Time of last solid consumption: 2200                        Date of last liquid consumption: 04/15/19                        Date of last solid food consumption: 04/15/19    BMI:   Wt Readings from Last 3 Encounters:   04/16/19 165 lb (74.8 kg)   08/31/18 155 lb (70.3 kg)   08/22/18 155 lb (70.3 kg)     Body mass index is 27.46 kg/m².     CBC:   Lab Results   Component Value Date    WBC 7.5 08/22/2018    RBC 4.55 08/22/2018    HGB 13.5 08/22/2018    HCT 43.4 08/22/2018    MCV 95.4 08/22/2018    RDW 14.1 08/22/2018     08/22/2018       CMP:   Lab Results   Component Value Date     08/22/2018    K 4.1 08/22/2018     08/22/2018    CO2 27 08/22/2018    BUN 16 08/22/2018    CREATININE 0.9 08/22/2018    LABGLOM >60 08/22/2018    GLUCOSE 64 08/22/2018    CALCIUM 9.4 08/22/2018       POC Tests: No results for input(s): POCGLU, POCNA, POCK, POCCL, POCBUN, POCHEMO, POCHCT in the last 72 hours. Coags: No results found for: PROTIME, INR, APTT    HCG (If Applicable): No results found for: PREGTESTUR, PREGSERUM, HCG, HCGQUANT     ABGs: No results found for: PHART, PO2ART, XIQ8OPP, ICT0ESF, BEART, D6OYDLQM     Type & Screen (If Applicable):  No results found for: LABABO, [address]    Anesthesia Evaluation  Patient summary reviewed and Nursing notes reviewed  Airway: Mallampati: II  TM distance: >3 FB   Neck ROM: full  Mouth opening: > = 3 FB Dental: normal exam         Pulmonary:normal exam    (+) shortness of breath: no interval change,  sleep apnea: on CPAP,                             Cardiovascular:Negative CV ROS                      Neuro/Psych:   Negative Neuro/Psych ROS              GI/Hepatic/Renal:   (+) GERD:,           Endo/Other: Negative Endo/Other ROS                    Abdominal:           Vascular: negative vascular ROS. Anesthesia Plan      general     ASA 2           MIPS: Postoperative opioids intended and Prophylactic antiemetics administered. Anesthetic plan and risks discussed with patient and child/children. Plan discussed with CRNA.                   Cinthia Kong, MAY - CRNA   4/16/2019

## 2019-04-16 NOTE — ANESTHESIA PROCEDURE NOTES
Peripheral Block    Patient location during procedure: PACU  Start time: 4/16/2019 2:13 PM  Staffing  Resident/CRNA: Enrike Gates APRN - CRNA  Performed: resident/CRNA   Preanesthetic Checklist  Completed: patient identified, site marked, surgical consent, pre-op evaluation, timeout performed, IV checked, risks and benefits discussed, monitors and equipment checked, anesthesia consent given, oxygen available and patient being monitored  Peripheral Block  Patient position: sitting  Prep: ChloraPrep  Patient monitoring: continuous pulse ox, cardiac monitor, frequent blood pressure checks and IV access  Block type: Brachial plexus  Laterality: right  Injection technique: single-shot  Procedures: ultrasound guided and nerve stimulator  Local infiltration: bupivacaine  Infiltration strength: 0.5 %  Dose: 20 mL  Interscalene  Provider prep: sterile gloves  Local infiltration: bupivacaine  Needle  Needle type: combined needle/nerve stimulator   Needle gauge: 22 G  Needle localization: anatomical landmarks, nerve stimulator and ultrasound guidance  Assessment  Injection assessment: negative aspiration for heme, no paresthesia on injection and local visualized surrounding nerve on ultrasound  Slow fractionated injection: yes  Hemodynamics: stable  Additional Notes  Pt tolerated procedure well  Reason for block: post-op pain management and at surgeon's request

## 2019-11-27 ENCOUNTER — PROCEDURE VISIT (OUTPATIENT)
Dept: OTOLARYNGOLOGY | Age: 64
End: 2019-11-27
Payer: COMMERCIAL

## 2019-11-27 DIAGNOSIS — H90.6 MIXED CONDUCTIVE AND SENSORINEURAL HEARING LOSS OF BOTH EARS: Primary | ICD-10-CM

## 2019-11-27 PROCEDURE — 92567 TYMPANOMETRY: CPT | Performed by: AUDIOLOGIST

## 2019-11-27 PROCEDURE — 92553 AUDIOMETRY AIR & BONE: CPT | Performed by: AUDIOLOGIST

## 2019-12-04 ENCOUNTER — OFFICE VISIT (OUTPATIENT)
Dept: OTOLARYNGOLOGY | Age: 64
End: 2019-12-04
Payer: COMMERCIAL

## 2019-12-04 VITALS
DIASTOLIC BLOOD PRESSURE: 78 MMHG | TEMPERATURE: 97.7 F | BODY MASS INDEX: 25.16 KG/M2 | SYSTOLIC BLOOD PRESSURE: 122 MMHG | WEIGHT: 151 LBS | HEIGHT: 65 IN

## 2019-12-04 DIAGNOSIS — H90.3 SENSORINEURAL HEARING LOSS (SNHL) OF BOTH EARS: ICD-10-CM

## 2019-12-04 DIAGNOSIS — H93.13 TINNITUS OF BOTH EARS: ICD-10-CM

## 2019-12-04 DIAGNOSIS — J30.9 ALLERGIC RHINITIS, UNSPECIFIED SEASONALITY, UNSPECIFIED TRIGGER: Primary | ICD-10-CM

## 2019-12-04 PROCEDURE — 99203 OFFICE O/P NEW LOW 30 MIN: CPT | Performed by: NURSE PRACTITIONER

## 2019-12-04 RX ORDER — AZELASTINE 1 MG/ML
SPRAY, METERED NASAL
Qty: 1 BOTTLE | Refills: 3 | Status: SHIPPED | OUTPATIENT
Start: 2019-12-04 | End: 2022-10-20 | Stop reason: ALTCHOICE

## 2019-12-04 ASSESSMENT — ENCOUNTER SYMPTOMS
GASTROINTESTINAL NEGATIVE: 1
RESPIRATORY NEGATIVE: 1
EYES NEGATIVE: 1

## 2021-03-20 ENCOUNTER — OFFICE VISIT (OUTPATIENT)
Age: 66
End: 2021-03-20

## 2021-03-20 DIAGNOSIS — Z11.59 SCREENING FOR VIRAL DISEASE: Primary | ICD-10-CM

## 2021-03-20 PROCEDURE — 99999 PR OFFICE/OUTPT VISIT,PROCEDURE ONLY: CPT | Performed by: NURSE PRACTITIONER

## 2021-03-21 LAB — SARS-COV-2, PCR: NOT DETECTED

## 2021-03-22 ENCOUNTER — ANESTHESIA EVENT (OUTPATIENT)
Dept: OPERATING ROOM | Age: 66
End: 2021-03-22

## 2021-03-25 ENCOUNTER — HOSPITAL ENCOUNTER (OUTPATIENT)
Age: 66
Setting detail: OUTPATIENT SURGERY
Discharge: HOME | End: 2021-03-25
Attending: ORTHOPAEDIC SURGERY | Admitting: ORTHOPAEDIC SURGERY

## 2021-03-25 ENCOUNTER — ANESTHESIA (OUTPATIENT)
Dept: OPERATING ROOM | Age: 66
End: 2021-03-25

## 2021-03-25 ENCOUNTER — HOSPITAL ENCOUNTER (OUTPATIENT)
Dept: GENERAL RADIOLOGY | Age: 66
Setting detail: OUTPATIENT SURGERY
Discharge: HOME | End: 2021-03-25

## 2021-03-25 VITALS
RESPIRATION RATE: 14 BRPM | BODY MASS INDEX: 25.66 KG/M2 | HEART RATE: 79 BPM | TEMPERATURE: 99.3 F | WEIGHT: 154 LBS | OXYGEN SATURATION: 96 % | HEIGHT: 65 IN | SYSTOLIC BLOOD PRESSURE: 140 MMHG | DIASTOLIC BLOOD PRESSURE: 73 MMHG

## 2021-03-25 VITALS
RESPIRATION RATE: 10 BRPM | DIASTOLIC BLOOD PRESSURE: 85 MMHG | SYSTOLIC BLOOD PRESSURE: 146 MMHG | OXYGEN SATURATION: 99 %

## 2021-03-25 DIAGNOSIS — Z01.818 PRE-OP TESTING: Primary | ICD-10-CM

## 2021-03-25 DIAGNOSIS — M79.645 PAIN OF LEFT THUMB: ICD-10-CM

## 2021-03-25 PROBLEM — M18.12 OSTEOARTHRITIS OF CARPOMETACARPAL (CMC) JOINT OF LEFT THUMB: Status: ACTIVE | Noted: 2021-03-25

## 2021-03-25 PROCEDURE — G8916 PT W IV AB GIVEN ON TIME: HCPCS

## 2021-03-25 PROCEDURE — 3209999900 FLUORO FOR SURGICAL PROCEDURES

## 2021-03-25 PROCEDURE — G8907 PT DOC NO EVENTS ON DISCHARG: HCPCS

## 2021-03-25 PROCEDURE — 25447 ARTHRP NTRCRP/CRP/MTCR NTRPS: CPT

## 2021-03-25 RX ORDER — CEFAZOLIN SODIUM 1 G/3ML
INJECTION, POWDER, FOR SOLUTION INTRAMUSCULAR; INTRAVENOUS PRN
Status: DISCONTINUED | OUTPATIENT
Start: 2021-03-25 | End: 2021-03-25 | Stop reason: SDUPTHER

## 2021-03-25 RX ORDER — NALOXONE HYDROCHLORIDE 0.4 MG/ML
INJECTION, SOLUTION INTRAMUSCULAR; INTRAVENOUS; SUBCUTANEOUS PRN
Status: DISCONTINUED | OUTPATIENT
Start: 2021-03-25 | End: 2021-03-25 | Stop reason: SDUPTHER

## 2021-03-25 RX ORDER — LIDOCAINE HYDROCHLORIDE 10 MG/ML
INJECTION, SOLUTION INFILTRATION; PERINEURAL PRN
Status: DISCONTINUED | OUTPATIENT
Start: 2021-03-25 | End: 2021-03-25 | Stop reason: SDUPTHER

## 2021-03-25 RX ORDER — FENTANYL CITRATE 50 UG/ML
INJECTION, SOLUTION INTRAMUSCULAR; INTRAVENOUS PRN
Status: DISCONTINUED | OUTPATIENT
Start: 2021-03-25 | End: 2021-03-25 | Stop reason: SDUPTHER

## 2021-03-25 RX ORDER — ONDANSETRON 2 MG/ML
4 INJECTION INTRAMUSCULAR; INTRAVENOUS
Status: DISCONTINUED | OUTPATIENT
Start: 2021-03-25 | End: 2021-03-25 | Stop reason: HOSPADM

## 2021-03-25 RX ORDER — HYDROMORPHONE HCL 110MG/55ML
0.25 PATIENT CONTROLLED ANALGESIA SYRINGE INTRAVENOUS EVERY 5 MIN PRN
Status: DISCONTINUED | OUTPATIENT
Start: 2021-03-25 | End: 2021-03-25 | Stop reason: HOSPADM

## 2021-03-25 RX ORDER — METOCLOPRAMIDE HYDROCHLORIDE 5 MG/ML
10 INJECTION INTRAMUSCULAR; INTRAVENOUS EVERY 6 HOURS
Status: DISCONTINUED | OUTPATIENT
Start: 2021-03-25 | End: 2021-03-25 | Stop reason: HOSPADM

## 2021-03-25 RX ORDER — PROMETHAZINE HYDROCHLORIDE 25 MG/ML
12.5 INJECTION, SOLUTION INTRAMUSCULAR; INTRAVENOUS
Status: DISCONTINUED | OUTPATIENT
Start: 2021-03-25 | End: 2021-03-25 | Stop reason: HOSPADM

## 2021-03-25 RX ORDER — OXYCODONE HYDROCHLORIDE AND ACETAMINOPHEN 5; 325 MG/1; MG/1
1 TABLET ORAL PRN
Status: COMPLETED | OUTPATIENT
Start: 2021-03-25 | End: 2021-03-25

## 2021-03-25 RX ORDER — HYDRALAZINE HYDROCHLORIDE 20 MG/ML
5 INJECTION INTRAMUSCULAR; INTRAVENOUS EVERY 10 MIN PRN
Status: DISCONTINUED | OUTPATIENT
Start: 2021-03-25 | End: 2021-03-25 | Stop reason: HOSPADM

## 2021-03-25 RX ORDER — FENTANYL CITRATE 50 UG/ML
50 INJECTION, SOLUTION INTRAMUSCULAR; INTRAVENOUS EVERY 5 MIN PRN
Status: DISCONTINUED | OUTPATIENT
Start: 2021-03-25 | End: 2021-03-25 | Stop reason: HOSPADM

## 2021-03-25 RX ORDER — MEPERIDINE HYDROCHLORIDE 25 MG/ML
12.5 INJECTION INTRAMUSCULAR; INTRAVENOUS; SUBCUTANEOUS EVERY 5 MIN PRN
Status: DISCONTINUED | OUTPATIENT
Start: 2021-03-25 | End: 2021-03-25 | Stop reason: HOSPADM

## 2021-03-25 RX ORDER — MIDAZOLAM HYDROCHLORIDE 1 MG/ML
INJECTION INTRAMUSCULAR; INTRAVENOUS PRN
Status: DISCONTINUED | OUTPATIENT
Start: 2021-03-25 | End: 2021-03-25 | Stop reason: SDUPTHER

## 2021-03-25 RX ORDER — LIDOCAINE HYDROCHLORIDE AND EPINEPHRINE 10; 10 MG/ML; UG/ML
INJECTION, SOLUTION INFILTRATION; PERINEURAL PRN
Status: DISCONTINUED | OUTPATIENT
Start: 2021-03-25 | End: 2021-03-25 | Stop reason: ALTCHOICE

## 2021-03-25 RX ORDER — LABETALOL HYDROCHLORIDE 5 MG/ML
5 INJECTION, SOLUTION INTRAVENOUS EVERY 10 MIN PRN
Status: DISCONTINUED | OUTPATIENT
Start: 2021-03-25 | End: 2021-03-25 | Stop reason: HOSPADM

## 2021-03-25 RX ORDER — HYDROMORPHONE HCL 110MG/55ML
0.5 PATIENT CONTROLLED ANALGESIA SYRINGE INTRAVENOUS EVERY 5 MIN PRN
Status: DISCONTINUED | OUTPATIENT
Start: 2021-03-25 | End: 2021-03-25 | Stop reason: HOSPADM

## 2021-03-25 RX ORDER — HYDROMORPHONE HCL 110MG/55ML
PATIENT CONTROLLED ANALGESIA SYRINGE INTRAVENOUS PRN
Status: DISCONTINUED | OUTPATIENT
Start: 2021-03-25 | End: 2021-03-25 | Stop reason: SDUPTHER

## 2021-03-25 RX ORDER — DIPHENHYDRAMINE HYDROCHLORIDE 50 MG/ML
12.5 INJECTION INTRAMUSCULAR; INTRAVENOUS
Status: DISCONTINUED | OUTPATIENT
Start: 2021-03-25 | End: 2021-03-25 | Stop reason: HOSPADM

## 2021-03-25 RX ORDER — SODIUM CHLORIDE, SODIUM LACTATE, POTASSIUM CHLORIDE, CALCIUM CHLORIDE 600; 310; 30; 20 MG/100ML; MG/100ML; MG/100ML; MG/100ML
INJECTION, SOLUTION INTRAVENOUS CONTINUOUS
Status: DISCONTINUED | OUTPATIENT
Start: 2021-03-25 | End: 2021-03-25 | Stop reason: HOSPADM

## 2021-03-25 RX ORDER — OXYCODONE HYDROCHLORIDE AND ACETAMINOPHEN 5; 325 MG/1; MG/1
2 TABLET ORAL PRN
Status: COMPLETED | OUTPATIENT
Start: 2021-03-25 | End: 2021-03-25

## 2021-03-25 RX ORDER — LIDOCAINE HYDROCHLORIDE 10 MG/ML
1 INJECTION, SOLUTION EPIDURAL; INFILTRATION; INTRACAUDAL; PERINEURAL
Status: DISCONTINUED | OUTPATIENT
Start: 2021-03-25 | End: 2021-03-25 | Stop reason: HOSPADM

## 2021-03-25 RX ORDER — PROPOFOL 10 MG/ML
INJECTION, EMULSION INTRAVENOUS PRN
Status: DISCONTINUED | OUTPATIENT
Start: 2021-03-25 | End: 2021-03-25 | Stop reason: SDUPTHER

## 2021-03-25 RX ADMIN — FENTANYL CITRATE 50 MCG: 50 INJECTION, SOLUTION INTRAMUSCULAR; INTRAVENOUS at 11:31

## 2021-03-25 RX ADMIN — PROPOFOL 80 MG: 10 INJECTION, EMULSION INTRAVENOUS at 11:36

## 2021-03-25 RX ADMIN — Medication 0.5 MG: at 13:31

## 2021-03-25 RX ADMIN — NALOXONE HYDROCHLORIDE 0.2 MG: 0.4 INJECTION, SOLUTION INTRAMUSCULAR; INTRAVENOUS; SUBCUTANEOUS at 12:53

## 2021-03-25 RX ADMIN — METOCLOPRAMIDE HYDROCHLORIDE 10 MG: 5 INJECTION INTRAMUSCULAR; INTRAVENOUS at 08:30

## 2021-03-25 RX ADMIN — FENTANYL CITRATE 50 MCG: 50 INJECTION, SOLUTION INTRAMUSCULAR; INTRAVENOUS at 12:04

## 2021-03-25 RX ADMIN — OXYCODONE HYDROCHLORIDE AND ACETAMINOPHEN 1 TABLET: 5; 325 TABLET ORAL at 13:31

## 2021-03-25 RX ADMIN — SODIUM CHLORIDE, SODIUM LACTATE, POTASSIUM CHLORIDE, CALCIUM CHLORIDE: 600; 310; 30; 20 INJECTION, SOLUTION INTRAVENOUS at 08:17

## 2021-03-25 RX ADMIN — CEFAZOLIN SODIUM 1000 MG: 1 INJECTION, POWDER, FOR SOLUTION INTRAMUSCULAR; INTRAVENOUS at 11:38

## 2021-03-25 RX ADMIN — Medication 0.75 MG: at 13:17

## 2021-03-25 RX ADMIN — PROPOFOL 70 MG: 10 INJECTION, EMULSION INTRAVENOUS at 11:35

## 2021-03-25 RX ADMIN — Medication 0.25 MG: at 12:30

## 2021-03-25 RX ADMIN — LIDOCAINE HYDROCHLORIDE 30 MG: 10 INJECTION, SOLUTION INFILTRATION; PERINEURAL at 11:35

## 2021-03-25 RX ADMIN — NALOXONE HYDROCHLORIDE 0.2 MG: 0.4 INJECTION, SOLUTION INTRAMUSCULAR; INTRAVENOUS; SUBCUTANEOUS at 12:57

## 2021-03-25 RX ADMIN — MIDAZOLAM HYDROCHLORIDE 1 MG: 1 INJECTION INTRAMUSCULAR; INTRAVENOUS at 11:31

## 2021-03-25 ASSESSMENT — ENCOUNTER SYMPTOMS: SHORTNESS OF BREATH: 1

## 2021-03-25 NOTE — OP NOTE
Patient Name: Glory Ceja  : 1955  MRN: [id number]    DATE of SURGERY: 3/25/2021    SURGEON: Winford Phoenix, MD    ASSISTANT: None    PREOPERATIVE DIAGNOSES: Left thumb CMC osteoarthritis      POSTOPERATIVE DIAGNOSES: Left thumb CMC osteoarthritis     PROCEDURE PERFORMED: Left CMC arthroplasty       IMPLANTS  None. ANESTHESIA    General endotracheal anesthesia with local.      OPERATIVE INDICATIONS    This patient is 76 y.o. female who was evaluated and treated in the clinic setting for left thumb CMC osteoarthritis. Unfortunately, conservative measures failed and they elected to proceed with operative intervention. She had similar symptoms on her right side ultimately undergoing operative intervention in 2019 with good results. She elected to proceed with similar procedure on the left side. Risks, benefits and alternatives were discussed. Risks including, but not limited to, bleeding, infection, numbness, nerve injury, failure to alleviate any or all of preoperative symptoms, thumb metacarpal subsidence, tendinitis,  weakness, pain and stiffness were discussed. Written and informed consent were obtained. ESTIMATED BLOOD LOSS    <20 mL. SPECIMENS  None. DRAINS  None. COMPLICATIONS    No intra-operative complications. PROCEDURE IN DETAIL  The patient was met in the preoperative holding area where the correct patient, procedure and side were confirmed. The operative site was marked by myself. She was then transported to the operating room, placed supine on operating room table and secured to the table with all bony prominences padded. A formal timeout was then held in which operating surgeon and operating team, again, identify the correct patient, procedure and side. General anesthesia was then induced. A nonsterile tourniquet was placed about the left brachium. The left upper extremity was then prepped and draped in normal sterile fashion.   A skin marker was used to delineate a [place] approach to the left [facility] joint. The left upper extremity was then exsanguinated with an Esmarch and the tourniquet was inflated to 250 mmHg for approximately 45 minutes. A 15 blade scalpel was used to incise only through the skin. Blunt scissor dissection was carried to subcutaneous tissue and branches of the superficial radial nerve were identified, protected and retracted throughout the procedure. The thenar musculature fascia was then incised in the musculature was elevated off of the CMC capsule ulnarly. The APL tendon was then identified dorsally at its insertion into the first metacarpal base. A longitudinal capsulotomy was made volar to the APL tendon insertion and the [facility] joint was identified. The [facility] joint was inspected and found to have diffuse grade 4 chondromalacia. Using sharp dissection, the capsule was then elevated off of the trapezium circumferentially with care taken to protect the dorsal vasculature and FCR tendon volarly. Using a combination of sharp dissection, [place] elevator and a rongeur, the trapezium was removed uneventfully. The FCR tendon was identified at the base of the wound and found to be intact. The wound was irrigated with normal saline to remove any small bony fragments. C-arm fluoroscopy confirmed adequate resection of the trapezium. After irrigation, the scaphotrapezoid joint was inspected and found to have no significant chondromalacia. At this point, longitudinal traction was placed through the thumb metacarpal and a 2-0 FiberWire suture was used to create a suture suspension plasty through the APL and FCR tendons. After creating the hammock, in addition to longitudinal traction, ulnar pressure over the base of the thumb metacarpal and an adduction moment was applied while the suture was tied. On gross inspection, adequate suspension was obtained.   With axial loading through the thumb, there was no appreciable significant subsidence of the metacarpal.  No bony abutment between the thumb metacarpal base and the scaphoid. The capsule was then closed with figure-of-eight 4-0 Vicryl sutures. The thenar fascia was closed with figure-of-eight 4-0 Vicryl sutures. The tourniquet was then deflated and hemostasis was obtained with electrocautery. The wound was irrigated again with normal saline. The skin was reapproximated with a running subcuticular 4-0 Monocryl suture. Prior to application of the splint, approximately 7 cc of 1% lidocaine with epinephrine was injected at the surgical site for postoperative pain control. A thumb spica splint was applied consisting of Xeroform, 4 x 4's, web roll, plaster and an Ace bandage. General anesthesia was reversed, patient was transferred to the hospital bed and moved to PACU in stable condition. All counts at the end the procedure were correct. Patient tolerated the procedure well and was without intraoperative complication. POSTOPERATIVE PLAN    1. Discharged home with family. 2.  Follow up in 2 weeks for a clinical check with plans to transition into a removable thumb spica brace. 3.  Elevate operative extremity. Ok for gentle hand/finger ROM. 4.  Pain control  5. Keep splint in place until follow-up. 6.  We will plan to begin active and passive range of motion exercises 4 weeks postop with strengthening at 6 weeks. Begin to transition out of the brace at the base with released to full activity 3 months.

## 2021-03-25 NOTE — ANESTHESIA PRE PROCEDURE
Department of Anesthesiology  Preprocedure Note       Name:  Marla Gomez   Age:  76 y.o.  :  1955                                          MRN:  [id number]         Date:  3/25/2021      Surgeon: Veronica Raymond):  Jordan Shields MD    Procedure: Procedure(s):  LEFT THUMB CARPOMETACARPAL ARTHROPLASTY    Medications prior to admission:   Prior to Admission medications    Medication Sig Start Date End Date Taking? Authorizing Provider   azelastine (ASTELIN) 0.1 % nasal spray 2 sprays each nostril nightly 19  Yes MAY Garrison - CNP   sertraline (ZOLOFT) 100 MG tablet Take 150 mg by mouth daily   Yes Historical Provider, MD   levothyroxine (SYNTHROID) 88 MCG tablet Take 88 mcg by mouth Daily   Yes Historical Provider, MD   estradiol (ESTRACE) 0.5 MG tablet Take 0.5 mg by mouth daily   Yes Historical Provider, MD   omeprazole (PRILOSEC) 20 MG delayed release capsule Take 20 mg by mouth Daily    Yes Historical Provider, MD   ranitidine (ZANTAC) 150 MG tablet Take 150 mg by mouth nightly   Yes Historical Provider, MD   Multiple Vitamins-Minerals (THERAPEUTIC MULTIVITAMIN-MINERALS) tablet Take 1 tablet by mouth daily   Yes Historical Provider, MD   fluticasone (FLONASE) 50 MCG/ACT nasal spray 1 spray by Each Nare route daily   Yes Historical Provider, MD   diclofenac sodium 1 % GEL diclofenac 1 % topical gel   prn    Historical Provider, MD       Current medications:    Current Facility-Administered Medications   Medication Dose Route Frequency Provider Last Rate Last Admin    lactated ringers infusion   Intravenous Continuous MAY Chin CRNA 125 mL/hr at 21 0817 New Bag at 21 0817    lidocaine PF 1 % injection 1 mL  1 mL Intradermal Once PRN Natiffanie Vasquez APRN - CRNA        metoclopramide (REGLAN) injection 10 mg  10 mg Intravenous Q6H NaomMAY Goyal CRNA   10 mg at 21 0830       Allergies:     Allergies   Allergen Reactions    Dye [Iodides]      \"it will ruin my only kidney\"      Codeine Nausea And Vomiting       Problem List:    Patient Active Problem List   Diagnosis Code    Chest pain R07.9    SOB (shortness of breath) R06.02    Complete tear of right rotator cuff M75.121       Past Medical History:        Diagnosis Date    Anxiety     Arthritis     Chest pain 10/12/2011    hx. of    CPAP (continuous positive airway pressure) dependence     GERD (gastroesophageal reflux disease)     S/P cardiac catheterization 10/12/11    Seasonal allergies     Shoulder pain     Sleep apnea     SOB (shortness of breath) 10/12/2011    hx. of    Thumb pain     raymond.  Thyroid disease        Past Surgical History:        Procedure Laterality Date    ARTHROPLASTY Right 4/16/2019    RIGHT THUMB [facility] ARTHROPLASTY performed by Liliana Bey MD at [address] [facility],[address] Bilateral     left open right needle    CARDIAC CATHETERIZATION      wnl    FOOT SURGERY Bilateral     right bunionectomy; left bunionectomy and bone grafts    HERNIA REPAIR      HYSTERECTOMY      KNEE SURGERY Left     [place] SHLDR ARTHROSCOP,SURG,W/ROTAT CUFF REPR Right 8/31/2018    SHOULDER ARTHROSCOPY ROTATOR CUFF REPAIR SUBACROMIAL DECOMPRESSION ACROMIOPLASTY DISTAL CLAVICLE EXCISION performed by Ritika Turcios MD at [facility] [place] [facility] Right     TOTAL NEPHRECTOMY Left 2006    donated to spouse.  TUBAL LIGATION         Social History:    Social History     Tobacco Use    Smoking status: Never Smoker    Smokeless tobacco: Never Used   Substance Use Topics    Alcohol use:  No                                Counseling given: Not Answered      Vital Signs (Current):   Vitals:    03/18/21 1350 03/25/21 0812   BP:  120/72   Pulse:  65   Resp:  16   Temp:  99.3 °F (37.4 °C)   SpO2:  95%   Weight: 154 lb (69.9 kg) 154 lb (69.9 kg)   Height: 5' 5\" (1.651 m) 5' 5\" (1.651 m)                                              BP Readings from Last 3 Encounters:   03/25/21 120/72   12/04/19 122/78   04/16/19 Ly Arnett 172/87       NPO Status: Time of last liquid consumption: 2300                        Time of last solid consumption: 2300                        Date of last liquid consumption: 03/24/21                        Date of last solid food consumption: 03/24/21    BMI:   Wt Readings from Last 3 Encounters:   03/25/21 154 lb (69.9 kg)   12/04/19 151 lb (68.5 kg)   04/16/19 165 lb (74.8 kg)     Body mass index is 25.63 kg/m². CBC:   Lab Results   Component Value Date    WBC 7.5 08/22/2018    RBC 4.55 08/22/2018    HGB 13.5 08/22/2018    HCT 43.4 08/22/2018    MCV 95.4 08/22/2018    RDW 14.1 08/22/2018     08/22/2018       CMP:   Lab Results   Component Value Date     08/22/2018    K 4.1 08/22/2018     08/22/2018    CO2 27 08/22/2018    BUN 16 08/22/2018    CREATININE 0.9 08/22/2018    LABGLOM >60 08/22/2018    GLUCOSE 64 08/22/2018    CALCIUM 9.4 08/22/2018       POC Tests: No results for input(s): POCGLU, POCNA, POCK, POCCL, POCBUN, POCHEMO, POCHCT in the last 72 hours.     Coags: No results found for: PROTIME, INR, APTT    HCG (If Applicable): No results found for: PREGTESTUR, PREGSERUM, HCG, HCGQUANT     ABGs: No results found for: PHART, PO2ART, CUR8OKB, GQH5GBB, BEART, W2DXUYEF     Type & Screen (If Applicable):  No results found for: LABABO, LABRH    Drug/Infectious Status (If Applicable):  No results found for: HIV, HEPCAB    COVID-19 Screening (If Applicable):   Lab Results   Component Value Date    COVID19 Not Detected 03/20/2021           Anesthesia Evaluation  Patient summary reviewed and Nursing notes reviewed  Airway: Mallampati: II  TM distance: >3 FB   Neck ROM: full  Mouth opening: > = 3 FB Dental: normal exam         Pulmonary:normal exam    (+) shortness of breath:  sleep apnea: on CPAP,                             Cardiovascular:Negative CV ROS             Beta Blocker:  Not on Beta Blocker         Neuro/Psych:   Negative Neuro/Psych ROS              GI/Hepatic/Renal:   (+) GERD:,           Endo/Other: Negative Endo/Other ROS                    Abdominal:           Vascular: negative vascular ROS. Anesthesia Plan      general     ASA 2       Induction: intravenous. Anesthetic plan and risks discussed with patient. Plan discussed with CRNA.                   MAY Aceves - CRNA   3/25/2021

## 2021-03-25 NOTE — ANESTHESIA POSTPROCEDURE EVALUATION
Department of Anesthesiology  Postprocedure Note    Patient: Dania Stanley  MRN: [id number]  YOB: 1955  Date of evaluation: 3/25/2021  Time:  1:08 PM     Procedure Summary     Date: 03/25/21 Room / Location: [facility] OR [address] [place] [address]    Anesthesia Start: 5762 Anesthesia Stop: 7285    Procedure: LEFT THUMB CARPOMETACARPAL ARTHROPLASTY (Left Thumb) Diagnosis: (M18.12)    Surgeons: Pita Sierra MD Responsible Provider: MAY Turner CRNA    Anesthesia Type: general ASA Status: 2          Anesthesia Type: general    Reggie Phase I:      Reggie Phase II: Reggie Score: 9    Last vitals: Reviewed and per EMR flowsheets.        Anesthesia Post Evaluation    Patient location during evaluation: bedside  Patient participation: complete - patient participated  Level of consciousness: sleepy but conscious  Airway patency: patent  Nausea & Vomiting: no nausea and no vomiting  Complications: no  Respiratory status: acceptable, room air and spontaneous ventilation  Hydration status: euvolemic

## 2021-04-26 ENCOUNTER — OFFICE VISIT (OUTPATIENT)
Dept: OTOLARYNGOLOGY | Facility: CLINIC | Age: 66
End: 2021-04-26

## 2021-04-26 VITALS
WEIGHT: 152.6 LBS | TEMPERATURE: 98.2 F | HEIGHT: 65 IN | BODY MASS INDEX: 25.43 KG/M2 | DIASTOLIC BLOOD PRESSURE: 72 MMHG | HEART RATE: 83 BPM | SYSTOLIC BLOOD PRESSURE: 115 MMHG

## 2021-04-26 DIAGNOSIS — R26.89 IMBALANCE: ICD-10-CM

## 2021-04-26 DIAGNOSIS — J30.9 ALLERGIC RHINITIS, UNSPECIFIED SEASONALITY, UNSPECIFIED TRIGGER: ICD-10-CM

## 2021-04-26 DIAGNOSIS — H90.3 SENSORINEURAL HEARING LOSS (SNHL) OF BOTH EARS: Primary | ICD-10-CM

## 2021-04-26 DIAGNOSIS — H69.83 ETD (EUSTACHIAN TUBE DYSFUNCTION), BILATERAL: ICD-10-CM

## 2021-04-26 DIAGNOSIS — R42 DIZZINESS: ICD-10-CM

## 2021-04-26 DIAGNOSIS — H93.13 TINNITUS OF BOTH EARS: ICD-10-CM

## 2021-04-26 PROCEDURE — 99204 OFFICE O/P NEW MOD 45 MIN: CPT | Performed by: NURSE PRACTITIONER

## 2021-04-26 RX ORDER — AZELASTINE 1 MG/ML
2 SPRAY, METERED NASAL 2 TIMES DAILY
Qty: 30 ML | Refills: 6 | Status: SHIPPED | OUTPATIENT
Start: 2021-04-26 | End: 2021-05-26

## 2021-04-26 RX ORDER — LEVOTHYROXINE SODIUM 88 UG/1
88 TABLET ORAL EVERY MORNING
COMMUNITY
Start: 2021-04-11

## 2021-04-26 RX ORDER — MOMETASONE FUROATE 50 UG/1
2 SPRAY, METERED NASAL 2 TIMES DAILY
Qty: 1 EACH | Refills: 6 | Status: SHIPPED | OUTPATIENT
Start: 2021-04-26 | End: 2021-08-24

## 2021-04-26 RX ORDER — SERTRALINE HYDROCHLORIDE 100 MG/1
150 TABLET, FILM COATED ORAL DAILY
COMMUNITY
Start: 2021-04-01

## 2021-04-26 RX ORDER — FLUTICASONE PROPIONATE 50 MCG
1 SPRAY, SUSPENSION (ML) NASAL
COMMUNITY
Start: 2020-12-22 | End: 2021-04-26 | Stop reason: ALTCHOICE

## 2021-04-26 RX ORDER — MULTIPLE VITAMINS W/ MINERALS TAB 9MG-400MCG
1 TAB ORAL
COMMUNITY

## 2021-04-26 RX ORDER — ESTRADIOL 0.5 MG/1
0.5 TABLET ORAL
COMMUNITY
Start: 2021-03-22
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
## 2021-05-24 ENCOUNTER — TELEPHONE (OUTPATIENT)
Dept: OTOLARYNGOLOGY | Facility: CLINIC | Age: 66
End: 2021-05-24

## 2021-05-24 NOTE — TELEPHONE ENCOUNTER
Patient called and states the nasonex spray is helping her. Also, her physical therapist recommended cawthorne/cooksey exercises and those are providing some relief as well. Will continue with these and let us know if anything changes

## 2021-08-24 RX ORDER — MOMETASONE FUROATE 50 UG/1
SPRAY, METERED NASAL
Qty: 1 EACH | Refills: 2 | Status: SHIPPED | OUTPATIENT
Start: 2021-08-24 | End: 2021-11-02 | Stop reason: SDUPTHER

## 2021-11-02 RX ORDER — MOMETASONE FUROATE 50 UG/1
2 SPRAY, METERED NASAL 2 TIMES DAILY
Qty: 1 EACH | Refills: 2 | Status: SHIPPED | OUTPATIENT
Start: 2021-11-02

## 2022-10-20 ENCOUNTER — HOSPITAL ENCOUNTER (OUTPATIENT)
Dept: PREADMISSION TESTING | Age: 67
Discharge: HOME | End: 2022-10-24
Payer: MEDICARE

## 2022-10-20 VITALS — WEIGHT: 161 LBS | HEIGHT: 63 IN | BODY MASS INDEX: 28.53 KG/M2

## 2022-10-20 PROCEDURE — 93005 ELECTROCARDIOGRAM TRACING: CPT | Performed by: ORTHOPAEDIC SURGERY

## 2022-10-20 RX ORDER — DONEPEZIL HYDROCHLORIDE 5 MG/1
5 TABLET, ORALLY DISINTEGRATING ORAL DAILY
COMMUNITY

## 2022-10-20 RX ORDER — ASPIRIN 81 MG/1
81 TABLET ORAL DAILY PRN
Status: ON HOLD | COMMUNITY
End: 2022-10-28 | Stop reason: SDUPTHER

## 2022-10-20 RX ORDER — FAMOTIDINE 40 MG/1
40 TABLET, FILM COATED ORAL 2 TIMES DAILY
COMMUNITY

## 2022-10-20 RX ORDER — MOMETASONE FUROATE 50 UG/1
2 SPRAY, METERED NASAL 2 TIMES DAILY
COMMUNITY

## 2022-10-20 RX ORDER — SERTRALINE HYDROCHLORIDE 25 MG/1
25 TABLET, FILM COATED ORAL DAILY
COMMUNITY

## 2022-10-20 RX ORDER — CYCLOSPORINE 0.5 MG/ML
1 EMULSION OPHTHALMIC 2 TIMES DAILY
COMMUNITY

## 2022-10-20 RX ORDER — CALCIUM CARB/VITAMIN D3/VIT K1 650MG-12.5
1 TABLET,CHEWABLE ORAL 2 TIMES DAILY
COMMUNITY

## 2022-10-20 RX ORDER — CETIRIZINE HYDROCHLORIDE 10 MG/1
10 TABLET ORAL DAILY
COMMUNITY

## 2022-10-20 NOTE — DISCHARGE INSTRUCTIONS
PREOPERATIVE GUIDELINES WHEN RECEIVING ANESTHESIA    Do not eat or drink anything after midnight, the night before your surgery. This is extremely important for your safety. Take a bath (or shower) the night before your surgery and you may brush your teeth the morning of your surgery. You will be scheduled to arrive at the hospital 2 hours before your surgery, or follow your surgeon's instructions. Dress comfortably. Wear loose clothing that will be easy to remove and comfortable for your trip home. You may wear eyeglasses or contacts but bring your cases with you as they must be remove before your surgery. Hearing aids and dentures will need to be removed before your surgery. Do not wear any jewelry, including body jewelry. All jewelry will need to be removed prior to your surgery. Do not wear fingernail polish or make-up. It is best not to bring any valuables with you. If you are to stay in the hospital overnight, bring your robe, slippers and personal toiletries that you may need. POSTOPERATIVE GUIDELINES AFTER RECEIVING ANESTHESIA    If you are to go home after your surgery, you will need a responsible adult to drive you home. You will not be able to take public transportation after your discharge from the Operative Care Unit unless you are accompanied by a        responsible adult. On returning home, be sure to follow your physician's orders regarding diet, activity and medications. Remember, surgery with general anesthesia or sedation may leave you sleepy, very tired and with a decreased appetite for 12 to 24 hours. If you develop any post-surgical complications or problems, call your surgeon or [facility] Emergency Department ([phone]). The day before your surgery, you will receive a phone call from the surgery nurse, to let you know what time to arrive on the day of surgery.   This call will usually be between 2-4 PM.  If you do not receive a phone call by 4 PM the day before your surgery, please call [phone] and let them know you have not received an arrival time. If your surgery is on Monday, your call will be on the Friday before your Monday surgery. MEDICATION INSTRUCTIONS PRIOR TO YOUR SURGERY      The morning of surgery: You can take all your usual prescribed medications with a small sip of water. DO NOT TAKE ANY DIABETIC MEDICATIONS the morning of your surgery. DO NOT TAKE ANY SUPPLEMENTS or over the counter medications the morning of  surgery. [address] [place] [address] Surgery Patients-Revised 6-    Visitors for surgery patients are essential for the patient's emotional well-being and care       post operatively. 2.   Visitor Expectations and Limitations            3. One visitor allowed with patients in the preop/postop rooms. 4.  A second visitor may sit in the waiting area. 5.  No children under 13 allowed in the pre-post op areas unless they are the patient. 6.  Two people may be with an underage surgical/procedural patient in preop/postop        room. 7.  If you are admitted to the hospital post operatively, there are NO RESTRICTIONS on       the floor at this time. 8.  If you are admitted to ICU postoperatively, you may have one visitor in the room from        7A-7P. A second visitor may sit in the ICU waiting room.   There can be no overnight

## 2022-10-20 NOTE — PROGRESS NOTES
Pt has had labs in July and due for labs 10/21/22 with dr. Valeriano Lopez in [facility]. Left message and received message from dori with dr. Valeriano Lopez. Awaiting labs from their office.

## 2022-10-22 LAB
EKG P AXIS: 34 DEGREES
EKG P-R INTERVAL: 168 MS
EKG Q-T INTERVAL: 428 MS
EKG QRS DURATION: 90 MS
EKG QTC CALCULATION (BAZETT): 420 MS
EKG T AXIS: 14 DEGREES

## 2022-10-22 PROCEDURE — 93010 ELECTROCARDIOGRAM REPORT: CPT | Performed by: INTERNAL MEDICINE

## 2022-10-27 ENCOUNTER — ANESTHESIA EVENT (OUTPATIENT)
Dept: OPERATING ROOM | Age: 67
End: 2022-10-27
Payer: MEDICARE

## 2022-10-28 ENCOUNTER — ANESTHESIA (OUTPATIENT)
Dept: OPERATING ROOM | Age: 67
End: 2022-10-28
Payer: MEDICARE

## 2022-10-28 ENCOUNTER — HOSPITAL ENCOUNTER (OUTPATIENT)
Age: 67
Setting detail: OUTPATIENT SURGERY
Discharge: HOME | End: 2022-10-28
Attending: ORTHOPAEDIC SURGERY | Admitting: ORTHOPAEDIC SURGERY
Payer: MEDICARE

## 2022-10-28 VITALS
SYSTOLIC BLOOD PRESSURE: 172 MMHG | RESPIRATION RATE: 16 BRPM | HEART RATE: 73 BPM | OXYGEN SATURATION: 100 % | BODY MASS INDEX: 28.53 KG/M2 | WEIGHT: 161 LBS | HEIGHT: 63 IN | TEMPERATURE: 97.9 F | DIASTOLIC BLOOD PRESSURE: 91 MMHG

## 2022-10-28 DIAGNOSIS — S83.281D OTHER TEAR OF LATERAL MENISCUS, CURRENT INJURY, RIGHT KNEE, SUBSEQUENT ENCOUNTER: Primary | ICD-10-CM

## 2022-10-28 PROCEDURE — 3700000000 HC ANESTHESIA ATTENDED CARE: Performed by: ORTHOPAEDIC SURGERY

## 2022-10-28 PROCEDURE — 2500000003 HC RX 250 WO HCPCS: Performed by: ANESTHESIOLOGY

## 2022-10-28 PROCEDURE — 7100000010 HC PHASE II RECOVERY - FIRST 15 MIN: Performed by: ORTHOPAEDIC SURGERY

## 2022-10-28 PROCEDURE — 7100000001 HC PACU RECOVERY - ADDTL 15 MIN: Performed by: ORTHOPAEDIC SURGERY

## 2022-10-28 PROCEDURE — 2500000003 HC RX 250 WO HCPCS: Performed by: ORTHOPAEDIC SURGERY

## 2022-10-28 PROCEDURE — 7100000000 HC PACU RECOVERY - FIRST 15 MIN: Performed by: ORTHOPAEDIC SURGERY

## 2022-10-28 PROCEDURE — 3600000014 HC SURGERY LEVEL 4 ADDTL 15MIN: Performed by: ORTHOPAEDIC SURGERY

## 2022-10-28 PROCEDURE — 3600000004 HC SURGERY LEVEL 4 BASE: Performed by: ORTHOPAEDIC SURGERY

## 2022-10-28 PROCEDURE — 6360000002 HC RX W HCPCS: Performed by: ANESTHESIOLOGY

## 2022-10-28 PROCEDURE — 7100000011 HC PHASE II RECOVERY - ADDTL 15 MIN: Performed by: ORTHOPAEDIC SURGERY

## 2022-10-28 PROCEDURE — 3700000001 HC ADD 15 MINUTES (ANESTHESIA): Performed by: ORTHOPAEDIC SURGERY

## 2022-10-28 PROCEDURE — 2580000003 HC RX 258: Performed by: ANESTHESIOLOGY

## 2022-10-28 PROCEDURE — 2709999900 HC NON-CHARGEABLE SUPPLY: Performed by: ORTHOPAEDIC SURGERY

## 2022-10-28 PROCEDURE — 6360000002 HC RX W HCPCS: Performed by: ORTHOPAEDIC SURGERY

## 2022-10-28 RX ORDER — PROPOFOL 10 MG/ML
INJECTION, EMULSION INTRAVENOUS PRN
Status: DISCONTINUED | OUTPATIENT
Start: 2022-10-28 | End: 2022-10-28 | Stop reason: SDUPTHER

## 2022-10-28 RX ORDER — LIDOCAINE HYDROCHLORIDE 10 MG/ML
1 INJECTION, SOLUTION EPIDURAL; INFILTRATION; INTRACAUDAL; PERINEURAL
Status: DISCONTINUED | OUTPATIENT
Start: 2022-10-28 | End: 2022-10-28 | Stop reason: HOSPADM

## 2022-10-28 RX ORDER — ASPIRIN 81 MG/1
81 TABLET ORAL 2 TIMES DAILY
Qty: 42 TABLET | Refills: 0 | Status: SHIPPED | OUTPATIENT
Start: 2022-10-28 | End: 2022-11-18

## 2022-10-28 RX ORDER — SODIUM CHLORIDE, SODIUM LACTATE, POTASSIUM CHLORIDE, CALCIUM CHLORIDE 600; 310; 30; 20 MG/100ML; MG/100ML; MG/100ML; MG/100ML
INJECTION, SOLUTION INTRAVENOUS CONTINUOUS
Status: DISCONTINUED | OUTPATIENT
Start: 2022-10-28 | End: 2022-10-28 | Stop reason: HOSPADM

## 2022-10-28 RX ORDER — FAMOTIDINE 20 MG/1
20 TABLET, FILM COATED ORAL ONCE
Status: DISCONTINUED | OUTPATIENT
Start: 2022-10-28 | End: 2022-10-28 | Stop reason: HOSPADM

## 2022-10-28 RX ORDER — ONDANSETRON 2 MG/ML
INJECTION INTRAMUSCULAR; INTRAVENOUS PRN
Status: DISCONTINUED | OUTPATIENT
Start: 2022-10-28 | End: 2022-10-28 | Stop reason: SDUPTHER

## 2022-10-28 RX ORDER — METHYLPREDNISOLONE ACETATE 80 MG/ML
INJECTION, SUSPENSION INTRA-ARTICULAR; INTRALESIONAL; INTRAMUSCULAR; SOFT TISSUE PRN
Status: DISCONTINUED | OUTPATIENT
Start: 2022-10-28 | End: 2022-10-28 | Stop reason: ALTCHOICE

## 2022-10-28 RX ORDER — SCOLOPAMINE TRANSDERMAL SYSTEM 1 MG/1
1 PATCH, EXTENDED RELEASE TRANSDERMAL
Status: DISCONTINUED | OUTPATIENT
Start: 2022-10-28 | End: 2022-10-28 | Stop reason: HOSPADM

## 2022-10-28 RX ORDER — MEPERIDINE HYDROCHLORIDE 25 MG/ML
12.5 INJECTION INTRAMUSCULAR; INTRAVENOUS; SUBCUTANEOUS EVERY 5 MIN PRN
Status: DISCONTINUED | OUTPATIENT
Start: 2022-10-28 | End: 2022-10-28 | Stop reason: HOSPADM

## 2022-10-28 RX ORDER — FENTANYL CITRATE 50 UG/ML
INJECTION, SOLUTION INTRAMUSCULAR; INTRAVENOUS PRN
Status: DISCONTINUED | OUTPATIENT
Start: 2022-10-28 | End: 2022-10-28 | Stop reason: SDUPTHER

## 2022-10-28 RX ORDER — METOCLOPRAMIDE HYDROCHLORIDE 5 MG/ML
10 INJECTION INTRAMUSCULAR; INTRAVENOUS
Status: DISCONTINUED | OUTPATIENT
Start: 2022-10-28 | End: 2022-10-28 | Stop reason: HOSPADM

## 2022-10-28 RX ORDER — KETOROLAC TROMETHAMINE 30 MG/ML
15 INJECTION, SOLUTION INTRAMUSCULAR; INTRAVENOUS ONCE
Status: COMPLETED | OUTPATIENT
Start: 2022-10-28 | End: 2022-10-28

## 2022-10-28 RX ORDER — MORPHINE SULFATE 4 MG/ML
4 INJECTION, SOLUTION INTRAMUSCULAR; INTRAVENOUS EVERY 5 MIN PRN
Status: DISCONTINUED | OUTPATIENT
Start: 2022-10-28 | End: 2022-10-28 | Stop reason: HOSPADM

## 2022-10-28 RX ORDER — DIPHENHYDRAMINE HYDROCHLORIDE 50 MG/ML
12.5 INJECTION INTRAMUSCULAR; INTRAVENOUS
Status: DISCONTINUED | OUTPATIENT
Start: 2022-10-28 | End: 2022-10-28 | Stop reason: HOSPADM

## 2022-10-28 RX ORDER — DOCUSATE SODIUM 100 MG/1
100 CAPSULE, LIQUID FILLED ORAL 2 TIMES DAILY
Qty: 60 CAPSULE | Refills: 1 | Status: SHIPPED | OUTPATIENT
Start: 2022-10-28

## 2022-10-28 RX ORDER — ONDANSETRON 4 MG/1
4 TABLET, FILM COATED ORAL EVERY 8 HOURS PRN
Qty: 20 TABLET | Refills: 1 | Status: SHIPPED | OUTPATIENT
Start: 2022-10-28

## 2022-10-28 RX ORDER — CYCLOBENZAPRINE HCL 10 MG
10 TABLET ORAL 3 TIMES DAILY PRN
Qty: 30 TABLET | Refills: 0 | Status: SHIPPED | OUTPATIENT
Start: 2022-10-28 | End: 2022-11-07

## 2022-10-28 RX ORDER — OXYCODONE HYDROCHLORIDE AND ACETAMINOPHEN 5; 325 MG/1; MG/1
1 TABLET ORAL
Qty: 50 TABLET | Refills: 0 | Status: SHIPPED | OUTPATIENT
Start: 2022-10-28 | End: 2022-11-04

## 2022-10-28 RX ORDER — MIDAZOLAM HYDROCHLORIDE 2 MG/2ML
2 INJECTION, SOLUTION INTRAMUSCULAR; INTRAVENOUS
Status: DISCONTINUED | OUTPATIENT
Start: 2022-10-28 | End: 2022-10-28 | Stop reason: HOSPADM

## 2022-10-28 RX ORDER — BUPIVACAINE HYDROCHLORIDE 5 MG/ML
INJECTION, SOLUTION PERINEURAL PRN
Status: DISCONTINUED | OUTPATIENT
Start: 2022-10-28 | End: 2022-10-28 | Stop reason: ALTCHOICE

## 2022-10-28 RX ORDER — MORPHINE SULFATE 2 MG/ML
2 INJECTION, SOLUTION INTRAMUSCULAR; INTRAVENOUS EVERY 5 MIN PRN
Status: DISCONTINUED | OUTPATIENT
Start: 2022-10-28 | End: 2022-10-28 | Stop reason: HOSPADM

## 2022-10-28 RX ORDER — LIDOCAINE HYDROCHLORIDE 10 MG/ML
INJECTION, SOLUTION INFILTRATION; PERINEURAL PRN
Status: DISCONTINUED | OUTPATIENT
Start: 2022-10-28 | End: 2022-10-28 | Stop reason: SDUPTHER

## 2022-10-28 RX ADMIN — SODIUM CHLORIDE, POTASSIUM CHLORIDE, SODIUM LACTATE AND CALCIUM CHLORIDE: 600; 310; 30; 20 INJECTION, SOLUTION INTRAVENOUS at 14:32

## 2022-10-28 RX ADMIN — FENTANYL CITRATE 50 MCG: 50 INJECTION, SOLUTION INTRAMUSCULAR; INTRAVENOUS at 19:04

## 2022-10-28 RX ADMIN — MORPHINE SULFATE 4 MG: 4 INJECTION, SOLUTION INTRAMUSCULAR; INTRAVENOUS at 20:21

## 2022-10-28 RX ADMIN — PROPOFOL 150 MG: 10 INJECTION, EMULSION INTRAVENOUS at 19:04

## 2022-10-28 RX ADMIN — KETOROLAC TROMETHAMINE 15 MG: 30 INJECTION, SOLUTION INTRAMUSCULAR; INTRAVENOUS at 20:13

## 2022-10-28 RX ADMIN — LIDOCAINE HYDROCHLORIDE 50 MG: 10 INJECTION, SOLUTION INFILTRATION; PERINEURAL at 19:04

## 2022-10-28 RX ADMIN — FENTANYL CITRATE 50 MCG: 50 INJECTION, SOLUTION INTRAMUSCULAR; INTRAVENOUS at 19:15

## 2022-10-28 RX ADMIN — CEFAZOLIN 2 G: 10 INJECTION, POWDER, FOR SOLUTION INTRAVENOUS at 19:10

## 2022-10-28 RX ADMIN — KETOROLAC TROMETHAMINE 15 MG: 30 INJECTION, SOLUTION INTRAMUSCULAR; INTRAVENOUS at 20:15

## 2022-10-28 RX ADMIN — ONDANSETRON 4 MG: 2 INJECTION INTRAMUSCULAR; INTRAVENOUS at 19:19

## 2022-10-28 ASSESSMENT — PAIN DESCRIPTION - PAIN TYPE
TYPE: SURGICAL PAIN
TYPE: SURGICAL PAIN

## 2022-10-28 ASSESSMENT — PAIN DESCRIPTION - ORIENTATION
ORIENTATION: RIGHT

## 2022-10-28 ASSESSMENT — PAIN SCALES - GENERAL
PAINLEVEL_OUTOF10: 2
PAINLEVEL_OUTOF10: 8
PAINLEVEL_OUTOF10: 9
PAINLEVEL_OUTOF10: 3

## 2022-10-28 ASSESSMENT — PAIN DESCRIPTION - LOCATION
LOCATION: KNEE

## 2022-10-28 ASSESSMENT — LIFESTYLE VARIABLES: SMOKING_STATUS: 0

## 2022-10-28 ASSESSMENT — PAIN - FUNCTIONAL ASSESSMENT: PAIN_FUNCTIONAL_ASSESSMENT: 0-10

## 2022-10-28 ASSESSMENT — PAIN DESCRIPTION - DESCRIPTORS
DESCRIPTORS: ACHING
DESCRIPTORS: ACHING

## 2022-10-28 ASSESSMENT — ENCOUNTER SYMPTOMS: SHORTNESS OF BREATH: 1

## 2022-10-28 NOTE — H&P
Pt Name: Daren Kong  MRN: [id number]  YOB: 1955  Date: 10/28/2022    HPI: 78y.o. year old female with a known Lateral  meniscus tear of the right knee. Chronic pain and mechanical symptoms have been non responsive to conservative treatments, not limited to, NSAIDs, corticosteroid injections, and physical therapy. Past Medical/Surgical History:   Past Medical History:   Diagnosis Date    Anxiety     Arthritis     Chest pain 10/12/2011    hx. of; sees cardiology that is in [place]    CPAP (continuous positive airway pressure) dependence     GERD (gastroesophageal reflux disease)     Knee pain     S/P cardiac catheterization 10/12/2011    Seasonal allergies     Shoulder pain     Sleep apnea     SOB (shortness of breath) 10/12/2011    hx. of    Thumb pain     raymond. Thyroid disease      Past Surgical History:   Procedure Laterality Date    ARTHROPLASTY Right 04/16/2019    RIGHT THUMB Aia 16 ARTHROPLASTY performed by Jimy Nielson MD at [address] Left 03/25/2021    LEFT THUMB CARPOMETACARPAL ARTHROPLASTY performed by Jimy Nielson MD at [address] Bilateral     left open right needle    CARDIAC CATHETERIZATION      wnl    FOOT SURGERY Bilateral     right bunionectomy; left bunionectomy and bone grafts    HERNIA REPAIR      HYSTERECTOMY (CERVIX STATUS UNKNOWN)      KNEE SURGERY Left     [place] SHLDR ARTHROSCOP,SURG,W/ROTAT CUFF REPR Right 08/31/2018    SHOULDER ARTHROSCOPY ROTATOR CUFF REPAIR SUBACROMIAL DECOMPRESSION ACROMIOPLASTY DISTAL CLAVICLE EXCISION performed by Kate Riojas MD at [address] Left 2006    donated to spouse. TUBAL LIGATION       Social History:    Smoking:  No Smoking History = 0   Alcohol: Rare social consumption    Medications:   Prior to Admission medications    Medication Sig Start Date End Date Taking?  Authorizing Provider   cetirizine (ZYRTEC) 10 MG tablet Take 10 mg by mouth daily    Historical Provider, MD gaviriazil (ARICEPT ODT) 5 MG disintegrating tablet Take 5 mg by mouth daily Indications: Memory Impairment    Historical Provider, MD   famotidine (PEPCID) 40 MG tablet Take 40 mg by mouth 2 times daily Indications: Reflux Gastritis    Historical Provider, MD   aspirin 81 MG EC tablet Take 81 mg by mouth daily as needed for Pain    Historical Provider, MD   metoprolol tartrate (LOPRESSOR) 25 MG tablet Take 25 mg by mouth 2 times daily Indications: High Blood Pressure Disorder    Historical Provider, MD   mometasone (NASONEX) 50 MCG/ACT nasal spray 2 sprays by Nasal route 2 times daily    Historical Provider, MD   cycloSPORINE (RESTASIS) 0.05 % ophthalmic emulsion Place 1 drop into both eyes 2 times daily    Historical Provider, MD   sertraline (ZOLOFT) 25 MG tablet Take 25 mg by mouth daily    Historical Provider, MD   Calcium-Vitamin D-Vitamin K (VIACTIV CALCIUM PLUS D) 650-12.5-40 MG-MCG-MCG CHEW Take 1 Dose by mouth 2 times daily    Historical Provider, MD   NONFORMULARY Apply topically 4 times daily as needed (compounded topical pain gel)    Historical Provider, MD   levothyroxine (SYNTHROID) 88 MCG tablet Take 88 mcg by mouth Daily    Historical Provider, MD   estradiol (ESTRACE) 0.5 MG tablet Take 0.5 mg by mouth daily    Historical Provider, MD   Multiple Vitamins-Minerals (THERAPEUTIC MULTIVITAMIN-MINERALS) tablet Take 1 tablet by mouth daily    Historical Provider, MD     Allergies:    Allergies   Allergen Reactions    Dye [Iodides]      \"it will ruin my only kidney\"      Codeine Nausea And Vomiting     Review of systems:     * Constitutional: negative     * HEENT: negative     * Skin: negative     * Cardiac: negative     * Respiratory: negative     * GI: negative     * : negative     * Neuro: negative     * CNS: negative     * Extremities: negative     * Endcrine: negative     Physical Exam:   BP (!) 141/96   Pulse 78   Temp 97.9 °F (36.6 °C) (Tympanic)   Resp 18   Ht 5' 3\" (1.6 m)   Wt 161 lb (73 kg)   SpO2 92%   BMI 28.52 kg/m²     - General: normal development and appearance     - HEENT: normocephalic, LAURENCE     - Skin: pink, warm, normal tone     - Neck: supple, no masses,     - Chest: no rales or wheezes, normal expansion     - Heart: RRR, no murmurs/gallops     - Abdomen: soft, nondistended, nontender, normal sounds     - Vascular: normal pulses, color, tone     - Pysch/Neuro: normal affect, mood, alert and oriented     - Musculoskeletal: Physical exam of the patient's right knee shows the skin is clean, dry, and intact. No deformities, lesions, or errythema noted. NVI distally. Tenderness to palpation Lateral joint line. Decreased range of motion and weakness secondary to pain. No ligamentous laxity noted. Positive McMurrays. Impression: Lateral meniscus tear of the right knee. Surgical Plan: Arthroscopic partial Lateral menisectomy of the right knee.     Electronically signed by Sesar Oneal MD on 10/28/2022 at 5:35 PM

## 2022-10-28 NOTE — ANESTHESIA PRE PROCEDURE
Department of Anesthesiology  Preprocedure Note       Name:  Jethro Chino   Age:  78 y.o.  :  1955                                          MRN:  [id number]         Date:  10/28/2022      Surgeon: Thomas Becerar):  Sarah Tabares MD    Procedure: Procedure(s):  RIGHT KNEE ARTHROSCOPIC PARTIAL LATERAL MENISCECTOMY WITH POSSIBLE SHAVED CHONDROPLASTIES    Medications prior to admission:   Prior to Admission medications    Medication Sig Start Date End Date Taking?  Authorizing Provider   cetirizine (ZYRTEC) 10 MG tablet Take 10 mg by mouth daily    Historical Provider, MD   donepezil (ARICEPT ODT) 5 MG disintegrating tablet Take 5 mg by mouth daily Indications: Memory Impairment    Historical Provider, MD   famotidine (PEPCID) 40 MG tablet Take 40 mg by mouth 2 times daily Indications: Reflux Gastritis    Historical Provider, MD   aspirin 81 MG EC tablet Take 81 mg by mouth daily as needed for Pain    Historical Provider, MD   metoprolol tartrate (LOPRESSOR) 25 MG tablet Take 25 mg by mouth 2 times daily Indications: High Blood Pressure Disorder    Historical Provider, MD   mometasone (NASONEX) 50 MCG/ACT nasal spray 2 sprays by Nasal route 2 times daily    Historical Provider, MD   cycloSPORINE (RESTASIS) 0.05 % ophthalmic emulsion Place 1 drop into both eyes 2 times daily    Historical Provider, MD   sertraline (ZOLOFT) 25 MG tablet Take 25 mg by mouth daily    Historical Provider, MD   Calcium-Vitamin D-Vitamin K (VIACTIV CALCIUM PLUS D) 650-12.5-40 MG-MCG-MCG CHEW Take 1 Dose by mouth 2 times daily    Historical Provider, MD   NONFORMULARY Apply topically 4 times daily as needed (compounded topical pain gel)    Historical Provider, MD   levothyroxine (SYNTHROID) 88 MCG tablet Take 88 mcg by mouth Daily    Historical Provider, MD   estradiol (ESTRACE) 0.5 MG tablet Take 0.5 mg by mouth daily    Historical Provider, MD   Multiple Vitamins-Minerals (THERAPEUTIC MULTIVITAMIN-MINERALS) tablet Take 1 tablet by mouth daily Historical Provider, MD       Current medications:    Current Facility-Administered Medications   Medication Dose Route Frequency Provider Last Rate Last Admin    ceFAZolin (ANCEF) 1,000 mg in sterile water 10 mL IV syringe  1,000 mg IntraVENous Once Consuelo Cornell MD        lactated ringers infusion   IntraVENous Continuous Joon Roman  mL/hr at 10/28/22 1432 New Bag at 10/28/22 1432       Allergies: Allergies   Allergen Reactions    Dye [Iodides]      \"it will ruin my only kidney\"      Codeine Nausea And Vomiting       Problem List:    Patient Active Problem List   Diagnosis Code    Chest pain R07.9    SOB (shortness of breath) R06.02    Complete tear of right rotator cuff M75.121    Osteoarthritis of carpometacarpal (CMC) joint of left thumb M18.12       Past Medical History:        Diagnosis Date    Anxiety     Arthritis     Chest pain 10/12/2011    hx. of; [facility]    CPAP (continuous positive airway pressure) dependence     GERD (gastroesophageal reflux disease)     Knee pain     S/P cardiac catheterization 10/12/2011    Seasonal allergies     Shoulder pain     Sleep apnea     SOB (shortness of breath) 10/12/2011    hx. of    Thumb pain     raymond.     Thyroid disease        Past Surgical History:        Procedure Laterality Date    ARTHROPLASTY Right 04/16/2019    RIGHT THUMB Aia 16 ARTHROPLASTY performed by Summer Turk MD at [facility] [address] Left 03/25/2021    LEFT THUMB CARPOMETACARPAL ARTHROPLASTY performed by Sumemr Turk MD at [address] [facility] Bilateral     left open right needle    CARDIAC CATHETERIZATION      [facility]    FOOT SURGERY Bilateral     right bunionectomy; left bunionectomy and bone grafts    HERNIA REPAIR      HYSTERECTOMY (CERVIX STATUS UNKNOWN)      KNEE SURGERY Left     TX SHLDR ARTHROSCOP,SURG,W/ROTAT CUFF REPR Right 08/31/2018    SHOULDER ARTHROSCOPY ROTATOR CUFF REPAIR SUBACROMIAL DECOMPRESSION ACROMIOPLASTY DISTAL CLAVICLE EXCISION performed by Uday Olson MD at [address] Left 2006    donated to spouse.  TUBAL LIGATION         Social History:    Social History     Tobacco Use    Smoking status: Never    Smokeless tobacco: Never   Substance Use Topics    Alcohol use: No                                Counseling given: Not Answered      Vital Signs (Current):   Vitals:    10/28/22 1426   BP: (!) 141/96   Pulse: 78   Resp: 18   Temp: 97.9 °F (36.6 °C)   TempSrc: Tympanic   SpO2: 92%   Weight: 161 lb (73 kg)   Height: 5' 3\" (1.6 m)                                              BP Readings from Last 3 Encounters:   10/28/22 (!) 141/96   03/25/21 (!) 146/85   03/25/21 (!) 140/73       NPO Status: Time of last liquid consumption: 0600                        Time of last solid consumption: 2100                        Date of last liquid consumption: 10/28/22                        Date of last solid food consumption: 10/27/22    BMI:   Wt Readings from Last 3 Encounters:   10/28/22 161 lb (73 kg)   10/20/22 161 lb (73 kg)   03/25/21 154 lb (69.9 kg)     Body mass index is 28.52 kg/m². CBC:   Lab Results   Component Value Date/Time    WBC 7.5 08/22/2018 09:00 AM    RBC 4.55 08/22/2018 09:00 AM    HGB 13.5 08/22/2018 09:00 AM    HCT 43.4 08/22/2018 09:00 AM    MCV 95.4 08/22/2018 09:00 AM    RDW 14.1 08/22/2018 09:00 AM     08/22/2018 09:00 AM       CMP:   Lab Results   Component Value Date/Time     08/22/2018 09:00 AM    K 4.1 08/22/2018 09:00 AM     08/22/2018 09:00 AM    CO2 27 08/22/2018 09:00 AM    BUN 16 08/22/2018 09:00 AM    CREATININE 0.9 08/22/2018 09:00 AM    LABGLOM >60 08/22/2018 09:00 AM    GLUCOSE 64 08/22/2018 09:00 AM    CALCIUM 9.4 08/22/2018 09:00 AM       POC Tests: No results for input(s): POCGLU, POCNA, POCK, POCCL, POCBUN, POCHEMO, POCHCT in the last 72 hours.     Coags: No results found for: PROTIME, INR, APTT    HCG (If Applicable): No results found for: PREGTESTUR, PREGSERUM, HCG, HCGQUANT     ABGs: No results found for: PHART, PO2ART, PQU8TIR, ZOX2WCL, BEART, U1ZVURYM     Type & Screen (If Applicable):  No results found for: LABABO, LABRH    Drug/Infectious Status (If Applicable):  No results found for: HIV, HEPCAB    COVID-19 Screening (If Applicable):   Lab Results   Component Value Date/Time    COVID19 Not Detected 03/20/2021 10:05 AM           Anesthesia Evaluation  Patient summary reviewed and Nursing notes reviewed no history of anesthetic complications:   Airway: Mallampati: II  TM distance: >3 FB   Neck ROM: full  Mouth opening: > = 3 FB   Dental: normal exam         Pulmonary:Negative Pulmonary ROS and normal exam  breath sounds clear to auscultation  (+) shortness of breath:  sleep apnea:      (-) not a current smoker          Patient did not smoke on day of surgery. Cardiovascular:        (-) hypertension, CAD,  angina and  CHF    NYHA Classification: I  ECG reviewed  Rhythm: regular  Rate: normal           Beta Blocker:  Not on Beta Blocker         Neuro/Psych:   Negative Neuro/Psych ROS     (-) seizures, CVA and depression/anxiety            GI/Hepatic/Renal: Neg GI/Hepatic/Renal ROS  (+) GERD:,      (-) hiatal hernia       Endo/Other: Negative Endo/Other ROS             Pt had PAT visit. Abdominal:       Abdomen: soft. Vascular: Other Findings:           Anesthesia Plan      general     ASA 2     (Iv zofran within 30 min of closing )  Induction: intravenous. BIS  MIPS: Postoperative opioids intended and Prophylactic antiemetics administered. Anesthetic plan and risks discussed with patient. Use of blood products discussed with patient whom. Plan discussed with CRNA.     Attending anesthesiologist reviewed and agrees with Pre Eval content                Semaj Joe MD   10/28/2022

## 2022-10-28 NOTE — DISCHARGE INSTRUCTIONS
Lower Extremity Post-op Instructions  Dr. Alejandra Cardona      POST-OP CARE: Please follow these instructions closely! IMPORTANT PHONE NUMBERS:  For emergencies, please call [phone]  You may reach Dr. Castro Jimenes or his medical assistants at [phone], M-F 8:00am-5:00pm  After 5pm or on the weekends, please call the answering service which can be reached from the number above   Call immediately if you have any of the following symptoms:  Elevated temperature above 101.5 degrees for more than 48 hours after surgery  Persistent drainage from wound  Severe pain around surgical site  Calf pain    Weight Bearing:   __X__ Weight Bearing as tolerated (with or without crutches)    Bathing:  DO NOT SOAK the dressings or incisions in water. Pat the dressing and incision sites dry after showering. _X_ You may remove your superficial Ace wrap dressing your Steri-Strips in place until follow-up and shower on the 3rd day following surgery; please cover your dressings and incisions thoroughly    Dressings: Do NOT remove dressing/splint unless unless told to do so. SOME DRAINAGE IS NORMAL! If you have a splint or cast, do NOT get wet!!    DO NOT touch, remove, or apply ointment to the incision and/or steri strips  Steri strips may fall off on their own  Signs of infection that warrant a phone call to our clinical line:  Excessive drainage or redness  Red streaking coming away from the incision  Increased pain  Increased temperature above 101.5 degrees    DRIVING:  Absolutely no driving while taking pain medication. This will be discussed at your first postop visit. Incision: Do NOT uncover unless you are told it is ok (see above). Black sutures will need to be removed 10-14 days after surgery. Elevate: Place 2 pillows under your ankle to get the incision area above the level of the heart to help in swelling (a recliner is not elevated!!!)    Ice: Ice your surgery site 5-6 times per day for 20 minutes at a time with dressing in place. You should wait at least 30 minutes before icing again to avoid ice irritation. It may be difficult at first to ice the surgery area due to the amount of dressing, but continue to be diligent with icing. Your dressings will be taken down at your first post-op appointment. Range of motion: For the knee- It is important to gain full extension (knee straight) as soon as possible following surgery. Ice to decrease swelling --> Knee fully straight --> Walk without a limp!!!  NO PILLOWS UNDER THE KNEE, ONLY under the ankle  For the foot/ankle- range of motion restrictions will be given to you at your first post-op appointment. Until that time, avoid any unnecessary range of motion. Physical therapy- Your physical therapy status will be discussed with you at your first  post-op appointment. **Achieving range of motion goals and decreasing swelling/inflammation are the primary focus for the first two (2) weeks following surgery. **    Medications: You will be discharged with the appropriate medications following your surgery. Fill these at the pharmacy and take them as directed on the label. Possible medications that will be prescribed are below. You may or may not receive all of these. Occasionally, additional medications may be given with specific instructions. Percocet/Norco (oxycodone/hydrocodone with tylenol) - Pain Medication, will cause drowsiness, possibly itchiness (this is NOT an allergy - use benadryl or an over the counter allergy medication such as Claritin or Zyrtec)     o Take 1-2 tablets every 4-6 hours. DO NOT EXCEED 4,000mg of Tylenol in 24 hours. **DO NOT MIX WITH ALCOHOL, DRIVE WHILE TAKING, OR TAKE with extra TYLENOL**     *  After the 2nd day of surgery, begin weaning pain medication (less pills, increased timeframe)     *  ALL narcotics will cause constipation - take a stool softener frequently.   If you become constipated consider taking Milk of Magnesia as directed on the bottle. Colace (Docusate) - stool softener, used for constipation    Zofran (Ondansetron) or Phenergan - Anti-nausea medication, will cause drowsiness    Aspirin 81 mg - all patients with lower extremity surgery should take one aspirin 81 mg but every 12 hours (twice daily) for prevention of blood clots for the first 3 weeks after surgery. DO NOT TAKE NSAID'S (ex. IBUPROFEN, MOTRIN, ALEVE, ETC) AFTER A BROKEN BONE HAS BEEN REPAIRED OR AFTER ACL SURGERY - THESE MEDICATIONS WILL SLOW THE HEALING PROCESS!!!    **If you are running low on pain medications, please notify us if you need a refill 24-48 hours prior to when you run out, so we can make arrangements to refill the prescription for you if we determine it is necessary. Learning About How to Use Crutches  Overview  Crutches can help you walk when you have an injured hip, leg, knee, ankle, or foot. Your doctor will tell you how much weight--if any--you can put on your leg. Be sure your crutches fit you. When you stand up in your normal posture, there should be space for two or three fingers between the top of the crutch and your armpit. When you let your hands hang down, the hand  should be at your wrists. When you put your hands on the hand , your elbows should be slightly bent. To stay safe when using crutches:  Look straight ahead, not down at your feet. Clear away small rugs, cords, or anything else that could cause you to trip, slip, or fall. Be very careful around pets and small children. They can get in your path when you least expect it. Be sure the rubber tips on your crutches are clean and in good condition to help prevent slipping. Avoid slick conditions, such as wet floors and snowy or icy driveways. In bad weather, be extra careful on curbs and steps. How to walk with crutches without weight on the injured leg     Keep the injured leg bent and off the ground. Set the crutches at arm's length in front of you.    Don't lean forward to reach farther. Move your weak or injured leg forward, almost even with the crutches. Bend your elbows slightly. Press the padded top parts of the crutches against your sides, under your armpits. Push straight down on the handgrips as you bring your good leg up, so it is even with the weak or injured leg. Keep all the weight on your hands and not on your underarms. Repeat. When you are confident using the crutches, you can move the crutches and your injured leg at the same time. Then push straight down on the crutches as you step past the crutches with your strong leg, as you would in normal walking. How to walk with crutches with weight on the injured leg     Put both crutches about 12 inches in front of you. The crutches and your feet should form a triangle. Hold the crutches close enough to your body so you can push straight down on them, but leave room between the crutches for your body to pass through. Don't lean forward to reach farther. Put your weight on the handgrips, not on the pads under your arms. Constant pressure against your underarms can cause numbness. Move your weak or injured leg forward so it's almost even with the crutches. Bring your good leg up, so it's even with your weak or injured leg. Move your crutches about 12 inches in front of you, and start the next step. When you're confident using the crutches, you can move the crutches and your injured leg at the same time. Then push straight down on the crutches as you step past the crutches with your strong leg, as you would in normal walking. How to sit down and stand up from a chair with crutches  To sit, back up to the chair. Use one hand to hold both crutches by the handgrips, beside your injured leg. With the other hand, hold onto the seat and slowly lower yourself onto the chair. Lay the crutches on the ground near your chair. If you prop them up, they may fall over.   To get up from a chair,  the crutches and put them in one hand beside your injured leg. Put your weight on the handgrips of the crutches and on your strong leg to stand up. How to go up and down stairs using crutches  Try this first with another person nearby to steady you if needed. And remember \"up with the good, down with the bad\" to help you lead with the correct leg. If the stairs don't have a handrail, stand near the edge of the stairs. To go up, step up with your stronger leg. Then bring the crutches and your weak or injured leg to the upper step. To go down, put your crutches and your weak or injured leg on the lower step. Then bring your stronger leg down to the lower step. If the stairs have a sturdy handrail, stand near the edge of the stairs. Put both crutches under the arm opposite the handrail. Use the hand opposite the handrail to hold both crutches by the handgrips. Hold on to the handrail as you go up or down. To go up, step up with your stronger leg. Then bring the crutches and your weak or injured leg to the upper step. To go down,put your crutches and your weak or injured leg on the lower step. Then bring your stronger leg down to the lower step. Follow-up care is a key part of your treatment and safety. Be sure to make and go to all appointments, and call your doctor if you are having problems. It's also a good idea to know your test results and keep a list of the medicines you take. Where can you learn more? Go to [email or web address]://[email or web address] org and sign in to your [employer] account. Enter D430 in the [employer] box to learn more about \"Learning About How to Use Crutches. \"     If you do not have an account, please click on the \"Sign Up Now\" link. Current as of: March 9, 2022               Content Version: 13.4  © 1441-8027 [employer]. Care instructions adapted under license by [employer].  If you have questions about a medical condition or this instruction, always ask your healthcare professional. [place] [address] any warranty or liability for your use of this information.

## 2022-10-28 NOTE — OP NOTE
Patient Name: Barb Vogt  : 1955  MRN: [id number]    DATE of SURGERY: 10/28/2022    SURGEON: Leo Bull MD    ASSISTANT: NONE    PREOPERATIVE DIAGNOSIS:  1) Traumatic complex tear of the posterior horn of the lateral meniscus, Right knee  2) Primary osteoarthritis Right knee  3) Right knee patellar chondromalacia  4) Right knee plica band syndrome    POSTOPERATIVE DIAGNOSIS:   1) Traumatic complex tear of the posterior horn of the lateral meniscus, Right knee  2) Primary osteoarthritis Right knee  3) Right knee patellar chondromalacia  4) Right knee plica band syndrome    PROCEDURE PERFORMED:  1) Right knee arthroscopic partial lateral meniscectomy  2) Right arthroscopic lateral femoral condyle chondroplasty  3) Right arthroscopic patellofemoral joint chondroplasty  4) Right knee arthroscopic plica band excision    IMPLANTS: none    ANESTHESIA USED: LMA    OPERATIVE INDICATIONS: This patient is a 78 y.o. female presented to my clinic with complaints of progressive pain and mechanical symptoms after a traumatic injury to the knee. An MRI was obtained which showed the above named diagnoses. Pain and mechanical symptoms were not relieved with conservative treatment consisting of anti-inflammatories, corticosteroid injections, physical therapy. Due to progressive pain and loss of function, surgical evaluation was discussed and the patient wished to proceed understanding risks, benefits, and alternatives. Surgical indications were to relieve pain and mechanical symptoms related to an unstable meniscus tear. Risks included, but were not limited to, that of anesthesia, bleeding, infection, pain, damage to local structures, need for further surgery, failure to improve, stiffness, failure of implants, and loss of function.       ESTIMATED BLOOD LOSS: minimal    DRAINS: none     COMPLICATIONS: none    SPECIMENS: none    OPERATIVE FINDINGS:  1) Exam Under Anesthesia:  ROM 0-120, stable ligamentously without laxity, no effusion  2) Patellofemoral Joint: Grade 3 change of the chondral surfaces, no loss bodies in gutters  3) Central Compartment:  Intact ACL/PCL  4) Lateral Compartment: Thickness cartilage loss of the chondral surface, complex tearing of the lateral meniscus  5) Medial Compartment: Grade 2 change of the chondral surfaces, intact medial meniscus    PROCEDURE IN DETAIL:  The patient was seen in the preoperative holding room, the informed consent was reviewed and signed, and the correct operative extremity marked with the patients agreement. The patient was transported to the operating room, where a timeout was performed identifying the correct patient and operative site. Perioperative antibiotics were administered prior to incision. A sterile prep and drape was performed. The operative leg was placed into an arthroscopic leg shrestha device, the contralateral leg well protected and a tourniquet was placed about the thigh. Total tourniquet time was <30 minutes. A standard anterolateral arthroscopy portal was established and an outside-in technique was used to establish and anteromedial portal.  The arthroscopic probe was inserted and a thorough diagnostic arthroscopy of the knee was performed as outline above. Attention was turned to the lateral compartment where a probe was used to demonstrate and unstable tear of the posterior horn of the lateral meniscus. The combination of a biter and shaver were used to debride the meniscus back to a stable surface. The probe was reinserted showing no further tear existed and the remaining meniscus to be stable. Thereafter, a probe was used to demonstrate unstable chondral flaps surrounding areas of full-thickness cartilage loss. A shaver and cautery device were used to debride the unstable portions of the cartilage. The probe was reinserted verifying the remaining cartilage to be stable without further injury.     Attention was then turned to the patellofemoral compartment where a probe was used to demonstrate unstable chondral flaps. A shaver and cautery device were used to debride the unstable portions of the cartilage. The probe was reinserted verifying the remaining cartilage to be stable without further injury. Lastly, a prominent medial plica band was encountered. Using a combination of shaver and cautery, it was debrided back to the margin of the capsule. Free fluid was suctioned from the knee, portals were closed with monocryl suture and steri-strips. A sterile dressing was placed. The patient was awakened from anesthesia, transported to the PACU in stable condition.     POSTOPERATIVE PLAN:  1) Discharge home with family  2) Return to clinic 2 weeks for a clinical check

## 2022-10-29 NOTE — PROGRESS NOTES
INSTRUCTED PATIENT ON CRUTCH USE AND ADJUSTED TO HER HEIGHT - COMPLETED RETURN DEMONSTRATION WITH EASE, GAIT STEADY, NO ACUTE DISTRESS NOTED, SKIN P/W/D

## 2022-10-29 NOTE — BRIEF OP NOTE
Brief Postoperative Note      Patient: Sd Mcgowan  YOB: 1955  MRN: [id number]    Date of Procedure: 10/28/2022    Pre-Op Diagnosis: Other tear of lateral meniscus, current injury, right knee, subsequent encounter [S84.213G]    Post-Op Diagnosis: Same       Procedure(s):  RIGHT KNEE ARTHROSCOPIC PARTIAL LATERAL MENISCECTOMY WITH SHAVED CHONDROPLASTIES    Surgeon(s):  Sophie Angeles MD    Assistant:  First Assistant: Ramos Hu RN    Anesthesia: Choice    Estimated Blood Loss (mL): Minimal    Complications: None    Specimens:   * No specimens in log *    Implants:  * No implants in log *      Drains: * No LDAs found *    Findings: Right knee for a meniscus tear with full-thickness cartilage loss of the lateral compartment and grade 4 change of the patellofemoral compartment with grade 3 change of the medial compartment    Electronically signed by Sophie Angeles MD on 10/28/2022 at 7:43 PM

## 2022-10-29 NOTE — ANESTHESIA POSTPROCEDURE EVALUATION
Department of Anesthesiology  Postprocedure Note    Patient: Jenifer Grande  MRN: [id number]  YOB: 1955  Date of evaluation: 10/28/2022      Procedure Summary     Date: 10/28/22 Room / Location: [address]    Anesthesia Start: 1901 Anesthesia Stop: 1946    Procedure: RIGHT KNEE ARTHROSCOPIC PARTIAL LATERAL MENISCECTOMY WITH SHAVED CHONDROPLASTIES (Right) Diagnosis:       Other tear of lateral meniscus, current injury, right knee, subsequent encounter      (Other tear of lateral meniscus, current injury, right knee, subsequent encounter [Z82.429E])    Surgeons: Rianna Cardoza MD Responsible Provider: Sylvester Sims MD    Anesthesia Type: general ASA Status: 2          Anesthesia Type: No value filed. Reggie Phase I: Reggie Score: 10    Reggie Phase II:        Anesthesia Post Evaluation    Patient location during evaluation: bedside  Level of consciousness: awake and alert  Airway patency: patent  Nausea & Vomiting: no nausea and no vomiting  Complications: no  Cardiovascular status: blood pressure returned to baseline  Respiratory status: acceptable and nasal cannula  Hydration status: stable  Comments: Patient transferred to [facility], spontaneous respirations, patent airway, report given to nurse.

## 2024-10-28 ASSESSMENT — ENCOUNTER SYMPTOMS
ALLERGIC/IMMUNOLOGIC NEGATIVE: 1
GASTROINTESTINAL NEGATIVE: 1
EYES NEGATIVE: 1
RESPIRATORY NEGATIVE: 1

## 2024-10-28 NOTE — PROGRESS NOTES
Malena Anguiano (:  1955) is a 68 y.o. female,Established patient, here for evaluation of the following chief complaint(s):  Follow-up (Billat knee)         Assessment & Plan  Bilateral primary osteoarthritis of knee     In consideration of the patient's previous success with injection therapy, I have agreed to update her bilateral knee injections at today's visit.  The risks and benefits of the procedure were explained to the patient including the risk for infection, nerve and artery damage, continued pain, continued decreased range of motion, paresthesias, paralysis, loss of limb, loss of life and the need for surgery, and the patient conveyed their understanding and willingness to proceed.  Under aseptic technique, the bilateral knees were injected using a 21-gauge needle with a solution of 40 mg / 1 cc of Kenalog corticosteroid and 4 cc of half percent bupivacaine local anesthetic.  The patient tolerated the procedure very well.  It is explained to the patient that this injection may be repeated as frequently as every 3 months or less frequently if their symptoms will allow.  She conveyed her understanding.  Orders:    BUPivacaine (MARCAINE) 0.5 % injection 20 mg    triamcinolone acetonide (KENALOG-40) injection 40 mg    BUPivacaine (MARCAINE) 0.5 % injection 20 mg    triamcinolone acetonide (KENALOG-40) injection 40 mg    DRAIN/INJECT LARGE JOINT/BURSA  PLAN:  The patient will follow-up on an as-needed basis    Return in about 3 months (around 2025).       Subjective   For review, the patient is a pleasant 68-year-old female who I have previously seen due to her bilateral knee osteoarthritis.  She has been successfully treating her symptoms with safe frequency corticosteroid injection therapy.  Due to symptom recurrence, she returns today in hopes of having those injections updated.        Review of Systems   Constitutional: Negative.    HENT: Negative.     Eyes: Negative.    Respiratory:

## 2024-10-29 ENCOUNTER — OFFICE VISIT (OUTPATIENT)
Age: 69
End: 2024-10-29

## 2024-10-29 VITALS — HEIGHT: 63 IN | BODY MASS INDEX: 28.52 KG/M2

## 2024-10-29 DIAGNOSIS — M17.0 BILATERAL PRIMARY OSTEOARTHRITIS OF KNEE: Primary | ICD-10-CM

## 2024-10-29 RX ORDER — BUPIVACAINE HYDROCHLORIDE 5 MG/ML
4 INJECTION, SOLUTION PERINEURAL ONCE
Status: COMPLETED | OUTPATIENT
Start: 2024-10-29 | End: 2024-10-29

## 2024-10-29 RX ORDER — TRIAMCINOLONE ACETONIDE 40 MG/ML
40 INJECTION, SUSPENSION INTRA-ARTICULAR; INTRAMUSCULAR ONCE
Status: COMPLETED | OUTPATIENT
Start: 2024-10-29 | End: 2024-10-29

## 2024-10-29 RX ADMIN — TRIAMCINOLONE ACETONIDE 40 MG: 40 INJECTION, SUSPENSION INTRA-ARTICULAR; INTRAMUSCULAR at 12:54

## 2024-10-29 RX ADMIN — BUPIVACAINE HYDROCHLORIDE 20 MG: 5 INJECTION, SOLUTION PERINEURAL at 12:54

## 2024-10-29 RX ADMIN — BUPIVACAINE HYDROCHLORIDE 20 MG: 5 INJECTION, SOLUTION PERINEURAL at 12:53

## 2024-10-30 NOTE — ASSESSMENT & PLAN NOTE
In consideration of the patient's previous success with injection therapy, I have agreed to update her bilateral knee injections at today's visit.  The risks and benefits of the procedure were explained to the patient including the risk for infection, nerve and artery damage, continued pain, continued decreased range of motion, paresthesias, paralysis, loss of limb, loss of life and the need for surgery, and the patient conveyed their understanding and willingness to proceed.  Under aseptic technique, the bilateral knees were injected using a 21-gauge needle with a solution of 40 mg / 1 cc of Kenalog corticosteroid and 4 cc of half percent bupivacaine local anesthetic.  The patient tolerated the procedure very well.  It is explained to the patient that this injection may be repeated as frequently as every 3 months or less frequently if their symptoms will allow.  She conveyed her understanding.  Orders:    BUPivacaine (MARCAINE) 0.5 % injection 20 mg    triamcinolone acetonide (KENALOG-40) injection 40 mg    BUPivacaine (MARCAINE) 0.5 % injection 20 mg    triamcinolone acetonide (KENALOG-40) injection 40 mg    DRAIN/INJECT LARGE JOINT/BURSA  PLAN:  The patient will follow-up on an as-needed basis

## 2025-01-27 NOTE — PROGRESS NOTES
YOB: 1955  Location: [facility]  Location Address: [address] [place] [address],  [address], [address] [place] [address]  Location Phone: [phone]    Chief Complaint   Patient presents with    Dizziness     Dizziness and balance issues started in 2022 after a fall on her face 2021. Patient has seen neurologist and was evaluated by neuropysch.  Per  At [facility] theres something messed up in right ear. Patient wears hearing aids.     Snoring     Stopped using cpap 2 weeks ago after having home sleep study and being told she does not have sleep apnea anymore.    Sore Throat     Sore throat when waking up        History of Present Illness  Dayana Ford is a 69 y.o. female.      History of Present Illness  The patient presents for evaluation of dizziness, snoring, nasal congestion, and sore throat.    She has been experiencing dizziness since 2022, which was preceded by a fall in 2021. She describes the sensation as an internal reaction to external stimuli, rather than a typical feeling of dizziness. This sensation often leads to imbalance, particularly when looking upwards or working above his head, necessitating rest or sitting down. She reports no vertigo. She has consulted with a neurologist and undergone extensive testing at [facility], but no vestibular rehabilitation was initiated. An MRI of the brain has not been performed. She wears hearing aids due to bilateral hearing loss. She recently had an eye exam.    She reports nightly snoring and underwent a repeat sleep study in 2024, during which she did not use a CPAP machine. The study confirmed the absence of sleep apnea but did indicate significant snoring.    She has a long-standing history of sinus-related issues dating back to her early teens, attributed to seasonal allergies and hay fever. These episodes are characterized by nasal congestion and ocular symptoms, but she does not experience sinus pain or pressure. She has not  undergone a CT scan of her sinuses. She has been experiencing frequent sneezing over the past week, which she attributes to dust exposure at home. She discontinued allergy medications a few years ago due to symptom exacerbation. She was previously prescribed 2 nasal sprays, one of which she discontinued due to adverse effects. She continues to use mometasone nasal spray twice daily, which provides relief.    She occasionally experiences a sore throat upon waking, but this is not a daily occurrence. she does not experience nighttime coughing. She frequently needs to clear her throat throughout the day.        Results    Reviewed:        Past Medical History:   Diagnosis Date    Allergic rhinitis     Anxiety     Depression     Dizziness 2022    Bad balance    GERD (gastroesophageal reflux disease)     HL (hearing loss) 2000    Hypothyroidism     Mixed hyperlipidemia     Sinusitis 1967    Sleep apnea     Tinnitus 1980       Past Surgical History:   Procedure Laterality Date    BREAST BIOPSY      BUNIONECTOMY      CARDIAC CATHETERIZATION      HERNIA REPAIR      HYSTERECTOMY      KNEE ARTHROSCOPY      NEPHRECTOMY      ROTATOR CUFF REPAIR      TUBAL ABDOMINAL LIGATION         Outpatient Medications Marked as Taking for the 1/29/25 encounter (Office Visit) with Varinder Andrade APRN   Medication Sig Dispense Refill    aspirin 81 MG EC tablet TAKE 1 TABLET BY MOUTH PO THREE DAYS PER WEEK      atorvastatin (LIPITOR) 10 MG tablet Daily.      estradiol (ESTRACE) 0.5 MG tablet Take 1 tablet by mouth.      levothyroxine (SYNTHROID, LEVOTHROID) 88 MCG tablet Take 1 tablet by mouth Every Morning.      mometasone (NASONEX) 50 MCG/ACT nasal spray 2 sprays into the nostril(s) as directed by provider 2 (Two) Times a Day. 1 each 2    multivitamin with minerals (therapeutic multivitamin-minerals) tablet tablet Take 1 tablet by mouth.      sertraline (ZOLOFT) 100 MG tablet Take 1.5 tablets by mouth Daily.      [DISCONTINUED] famotidine  (PEPCID) 40 MG tablet Take 1 tablet by mouth 2 (Two) Times a Day.         Metronidazole, Ibuprofen, Iodides, Nsaids, Codeine, and Iodinated contrast media    Family History   Problem Relation Age of Onset    Thyroid disease Sister        Social History     Socioeconomic History    Marital status: Single   Tobacco Use    Smoking status: Never    Smokeless tobacco: Never   Vaping Use    Vaping status: Never Used   Substance and Sexual Activity    Alcohol use: Not Currently    Drug use: Never       Review of Systems   Constitutional: Negative.    HENT:  Positive for congestion, hearing loss, sinus pressure, sinus pain and sore throat.         Admits snoring   Neurological:  Positive for dizziness and headaches.       There were no vitals filed for this visit.    Body mass index is 21.61 kg/m².    Objective     Physical Exam      Physical Exam  Vitals reviewed.   Constitutional:       Appearance: Normal appearance. She is normal weight.   HENT:      Head: Normocephalic.      Right Ear: Tympanic membrane, ear canal and external ear normal.      Left Ear: Tympanic membrane, ear canal and external ear normal.      Ears:      Comments: Bilateral hearing aids noted, removed for exam     Nose:      Comments: Nasal dryness noted bilaterally     Mouth/Throat:      Lips: Pink.      Mouth: Mucous membranes are moist.   Musculoskeletal:      Cervical back: Full passive range of motion without pain.   Neurological:      Mental Status: She is alert.   Psychiatric:         Behavior: Behavior is cooperative.           Assessment & Plan   Diagnoses and all orders for this visit:    1. Nasal congestion (Primary)  -     CT Sinus Without Contrast; Future    2. Sinus pain  -     CT Sinus Without Contrast; Future    3. Sinus pressure  -     CT Sinus Without Contrast; Future    4. Acute intractable headache, unspecified headache type  -     CT Sinus Without Contrast; Future    5. Sore throat    6. Snoring    7. Dizziness  -     Ambulatory  Referral to Physical Therapy for Evaluation & Treatment    Other orders  -     mupirocin (BACTROBAN) 2 % nasal ointment; Administer 1 Application into the nostril(s) as directed by provider 2 (Two) Times a Day for 14 days.  Dispense: 28 g; Refill: 0  -     Omeprazole 20 MG tablet delayed-release; Take 20 mg by mouth 2 (Two) Times a Day for 30 days.  Dispense: 60 each; Refill: 3      * Surgery not found *  Orders Placed This Encounter   Procedures    CT Sinus Without Contrast     Standing Status:   Future     Standing Expiration Date:   1/29/2026     Scheduling Instructions:      Stealth -  image guidance     Order Specific Question:   Release to patient     Answer:   Routine Release [[id number]]     Order Specific Question:   Reason for Exam:     Answer:   nasal congestion, sinus pain and pressure, headache    Ambulatory Referral to Physical Therapy for Evaluation & Treatment     Referral Priority:   Routine     Referral Type:   Physical Therapy     Referral Reason:   Specialty Services Required     Requested Specialty:   Physical Therapy     Number of Visits Requested:   1     Assessment & Plan  1. Dizziness.  The dizziness is not associated with a sensation of the room spinning, making it less likely to be ear-related. Previous ENT testing indicated negative pressure in the right ear, which typically fluctuates. The patient has some hearing loss in both ears and uses hearing aids. She was diagnosed with vertigo but did not undergo vestibular rehabilitation. No MRI of the brain has been conducted. Recent eye examination was performed. A referral for vestibular rehabilitation will be made to assess if it can help reset the vestibular system. If there is no improvement, further investigations such as an MRI of the brain and internal auditory canal or a neurology consultation may be considered.    2. Snoring.  The recent sleep study conducted in 11/2024 showed no evidence of sleep apnea, indicating that the use of a  CPAP machine is unnecessary. The patient has a history of sinus issues, which could be contributing to the snoring. A CT scan of the sinuses will be ordered to evaluate for potential sinus-related causes of the snoring.    3. Nasal congestion.  The patient has a history of seasonal allergies and has been using mometasone nasal spray, which provides relief. The nasal passages appear macerated, suggesting dryness and potential for nosebleeds. A nasal ointment will be prescribed for indefinite use to alleviate dryness. A CT scan of the sinuses will be ordered to further investigate the cause of the congestion. Allergy testing may be considered if the CT scan is inconclusive.    4. Sore throat.  The sore throat is more pronounced in the mornings, suggesting possible reflux. The patient does not experience heartburn or indigestion but may have hidden reflux affecting the throat, nasal tissues, and ears. A trial of medication for reflux will be initiated for 6 weeks. If there is no improvement, the medication will be discontinued.    Return in about 6 weeks (around 3/12/2025) for Recheck, CT with Dr. Geronimo.       Patient Instructions   CONTACT INFORMATION:  The main office phone number is [phone]. For emergencies after hours and on weekends, this number will convert over to our answering service and the on call provider will answer. Please try to keep non emergent phone calls/ questions to office hours 9am-5pm Monday through Friday.      [employer]  As an alternative, you can sign up and use the Epic MyChart system for more direct and quicker access for non emergent questions/ problems.  [facility] [employer] allows you to send messages to your doctor, view your test results, renew your prescriptions, schedule appointments, and more. To sign up, go to [employer] and click on the Sign Up Now link in the New User? box. Enter your [employer] Activation Code exactly as it appears below along with the last four  digits of your Social Security Number and your Date of Birth () to complete the sign-up process. If you do not sign up before the expiration date, you must request a new code.     [employer] Activation Code: Activation code not generated  Current [employer] Status: Active     If you have questions, you can email [email or web address] or call [phone] to talk to our [employer] staff. Remember, MyChart is NOT to be used for urgent needs. For medical emergencies, dial 911.     IF YOU SMOKE OR USE TOBACCO PLEASE READ THE FOLLOWING:  Why is smoking bad for me?  Smoking increases the risk of heart disease, lung disease, vascular disease, stroke, and cancer. If you smoke, STOP!        IF YOU SMOKE OR USE TOBACCO PLEASE READ THE FOLLOWING:  Why is smoking bad for me?  Smoking increases the risk of heart disease, lung disease, vascular disease, stroke, and cancer. If you smoke, STOP!     For more information:  Quit Now [place]  -QUIT-NOW  [email or web address]     Patient or patient representative verbalized consent for the use of Ambient Listening during the visit with  MENDOZA Hernandez for chart documentation. 2025  10:46 CST

## 2025-01-29 ENCOUNTER — OFFICE VISIT (OUTPATIENT)
Dept: OTOLARYNGOLOGY | Facility: CLINIC | Age: 70
End: 2025-01-29
Payer: MEDICARE

## 2025-01-29 VITALS — BODY MASS INDEX: 21.62 KG/M2 | HEIGHT: 63 IN | WEIGHT: 122 LBS

## 2025-01-29 DIAGNOSIS — J34.89 SINUS PRESSURE: ICD-10-CM

## 2025-01-29 DIAGNOSIS — J34.89 SINUS PAIN: ICD-10-CM

## 2025-01-29 DIAGNOSIS — J02.9 SORE THROAT: ICD-10-CM

## 2025-01-29 DIAGNOSIS — R42 DIZZINESS: ICD-10-CM

## 2025-01-29 DIAGNOSIS — R51.9 ACUTE INTRACTABLE HEADACHE, UNSPECIFIED HEADACHE TYPE: ICD-10-CM

## 2025-01-29 DIAGNOSIS — R06.83 SNORING: ICD-10-CM

## 2025-01-29 DIAGNOSIS — R09.81 NASAL CONGESTION: Primary | ICD-10-CM

## 2025-01-29 RX ORDER — FAMOTIDINE 40 MG/1
40 TABLET, FILM COATED ORAL 2 TIMES DAILY
COMMUNITY
End: 2025-01-29

## 2025-01-29 RX ORDER — ASPIRIN 81 MG/1
TABLET ORAL
COMMUNITY

## 2025-01-29 RX ORDER — ATORVASTATIN CALCIUM 10 MG/1
TABLET, FILM COATED ORAL EVERY 24 HOURS
COMMUNITY

## 2025-01-29 RX ORDER — NICOTINE POLACRILEX 4 MG/1
20 GUM, CHEWING ORAL 2 TIMES DAILY
Qty: 60 EACH | Refills: 3 | Status: SHIPPED | OUTPATIENT
Start: 2025-01-29 | End: 2025-02-28

## 2025-01-29 NOTE — PATIENT INSTRUCTIONS
CONTACT INFORMATION:  The main office phone number is [phone]. For emergencies after hours and on weekends, this number will convert over to our answering service and the on call provider will answer. Please try to keep non emergent phone calls/ questions to office hours 9am-5pm Monday through Friday.      [employer]  As an alternative, you can sign up and use the Epic MyChart system for more direct and quicker access for non emergent questions/ problems.  [employer] allows you to send messages to your doctor, view your test results, renew your prescriptions, schedule appointments, and more. To sign up, go to [employer] and click on the Sign Up Now link in the New User? box. Enter your [employer] Activation Code exactly as it appears below along with the last four digits of your Social Security Number and your Date of Birth () to complete the sign-up process. If you do not sign up before the expiration date, you must request a new code.     [employer] Activation Code: Activation code not generated  Current [employer] Status: Active     If you have questions, you can email [email or web address] or call [phone] to talk to our [employer] staff. Remember, [employer] is NOT to be used for urgent needs. For medical emergencies, dial 911.     IF YOU SMOKE OR USE TOBACCO PLEASE READ THE FOLLOWING:  Why is smoking bad for me?  Smoking increases the risk of heart disease, lung disease, vascular disease, stroke, and cancer. If you smoke, STOP!        IF YOU SMOKE OR USE TOBACCO PLEASE READ THE FOLLOWING:  Why is smoking bad for me?  Smoking increases the risk of heart disease, lung disease, vascular disease, stroke, and cancer. If you smoke, STOP!     For more information:  Quit Now [place]  -QUIT-NOW  [email or web address]

## 2025-02-06 ENCOUNTER — OFFICE VISIT (OUTPATIENT)
Age: 70
End: 2025-02-06

## 2025-02-06 VITALS — HEIGHT: 63 IN | WEIGHT: 161 LBS | BODY MASS INDEX: 28.53 KG/M2

## 2025-02-06 DIAGNOSIS — M17.0 BILATERAL PRIMARY OSTEOARTHRITIS OF KNEE: Primary | ICD-10-CM

## 2025-02-06 RX ORDER — TRIAMCINOLONE ACETONIDE 40 MG/ML
40 INJECTION, SUSPENSION INTRA-ARTICULAR; INTRAMUSCULAR ONCE
Status: SHIPPED | OUTPATIENT
Start: 2025-02-06

## 2025-02-06 RX ORDER — BUPIVACAINE HYDROCHLORIDE 5 MG/ML
4 INJECTION, SOLUTION PERINEURAL ONCE
Status: SHIPPED | OUTPATIENT
Start: 2025-02-06

## 2025-02-06 ASSESSMENT — ENCOUNTER SYMPTOMS
EYES NEGATIVE: 1
GASTROINTESTINAL NEGATIVE: 1
RESPIRATORY NEGATIVE: 1
ALLERGIC/IMMUNOLOGIC NEGATIVE: 1

## 2025-02-06 NOTE — PROGRESS NOTES
Malena Anguiano (:  1955) is a 69 y.o. female,Established patient, here for evaluation of the following chief complaint(s):  Follow-up (Bilateral knee injections )        Assessment & Plan  1. Bilateral knee osteoarthritis.  The patient reports that the previous injections provided approximately 6 months of relief, but the effects are now wearing off. Physical examination reveals terminal extension bilaterally, 120 degrees of flexion on both sides, joint line tenderness medially and laterally, moderate patellofemoral crepitus, mild effusion on the right, and no effusion on the left. There is no laxity with varus or valgus stressing, no patellar apprehension or instability, and no redness, warmth, or streaking. Sensation is intact distally, and muscle compartments are soft and compressible. The patient ambulates with a nonantalgic gait. Updated injections will be administered today to manage the symptoms.    In consideration of the patient's success with safe frequency corticosteroid injection therapy in the past I think it is reasonable to update her injections at today's visit.  The risks and benefits of the procedure were explained to the patient including the risk for infection, nerve and artery damage, continued pain, continued decreased range of motion, paresthesias, paralysis, loss of limb, loss of life and the need for surgery, and the patient conveyed their understanding and willingness to proceed.  Under aseptic technique, the bilateral knees were injected using a 21-gauge needle with a solution of 40 mg / 1 cc of Kenalog corticosteroid and 4 cc of half percent bupivacaine local anesthetic.  The patient tolerated the procedure very well.  It is explained to the patient that this injection may be repeated as frequently as every 3 months or less frequently if their symptoms will allow.  She conveyed her understanding.       ICD-10-CM    1. Bilateral primary osteoarthritis of knee  M17.0

## 2025-02-10 ENCOUNTER — HOSPITAL ENCOUNTER (OUTPATIENT)
Dept: CT IMAGING | Facility: HOSPITAL | Age: 70
Discharge: HOME | End: 2025-02-10
Admitting: NURSE PRACTITIONER
Payer: MEDICARE

## 2025-02-10 DIAGNOSIS — R51.9 ACUTE INTRACTABLE HEADACHE, UNSPECIFIED HEADACHE TYPE: ICD-10-CM

## 2025-02-10 DIAGNOSIS — R09.81 NASAL CONGESTION: ICD-10-CM

## 2025-02-10 DIAGNOSIS — J34.89 SINUS PRESSURE: ICD-10-CM

## 2025-02-10 DIAGNOSIS — J34.89 SINUS PAIN: ICD-10-CM

## 2025-02-10 PROCEDURE — 70486 CT MAXILLOFACIAL W/O DYE: CPT

## 2025-02-12 ENCOUNTER — TELEPHONE (OUTPATIENT)
Dept: OTOLARYNGOLOGY | Facility: CLINIC | Age: 70
End: 2025-02-12
Payer: MEDICARE

## 2025-02-12 NOTE — TELEPHONE ENCOUNTER
----- Message from Varinder Andrade sent at 2/11/2025  9:04 AM CST -----  Ct results. Keep f/u with Dr. Geronimo

## 2025-03-13 ENCOUNTER — OFFICE VISIT (OUTPATIENT)
Dept: OTOLARYNGOLOGY | Facility: CLINIC | Age: 70
End: 2025-03-13
Payer: MEDICARE

## 2025-03-13 VITALS
HEART RATE: 83 BPM | HEIGHT: 63 IN | BODY MASS INDEX: 21.62 KG/M2 | SYSTOLIC BLOOD PRESSURE: 146 MMHG | TEMPERATURE: 98 F | DIASTOLIC BLOOD PRESSURE: 72 MMHG | WEIGHT: 122 LBS

## 2025-03-13 DIAGNOSIS — K21.9 LARYNGOPHARYNGEAL REFLUX (LPR): ICD-10-CM

## 2025-03-13 DIAGNOSIS — R09.81 NASAL CONGESTION: Primary | ICD-10-CM

## 2025-03-13 DIAGNOSIS — R06.83 SNORING: ICD-10-CM

## 2025-03-13 DIAGNOSIS — R09.A2 GLOBUS SENSATION: ICD-10-CM

## 2025-03-13 DIAGNOSIS — J34.89 SINUS PRESSURE: ICD-10-CM

## 2025-03-13 DIAGNOSIS — J34.89 SINUS PAIN: ICD-10-CM

## 2025-03-13 DIAGNOSIS — J02.9 SORE THROAT: ICD-10-CM

## 2025-03-13 RX ORDER — NICOTINE POLACRILEX 4 MG/1
20 GUM, CHEWING ORAL 2 TIMES DAILY
Qty: 168 EACH | Refills: 4 | Status: SHIPPED | OUTPATIENT
Start: 2025-03-13

## 2025-03-13 RX ORDER — MUPIROCIN 20 MG/G
OINTMENT TOPICAL
COMMUNITY
Start: 2025-01-29

## 2025-03-13 NOTE — PROGRESS NOTES
YOB: 1955  Location: [facility]  Location Address: [address] [place] [address], [facility], [address] [place] [address]  Location Phone: [phone]    Chief Complaint   Patient presents with    Nasal Congestion       History of Present Illness  Dayana Ford is a 69 y.o. female.  Dayana Ford is here for follow up of ENT complaints. The patient has had problems with globus sensation, dizziness, snoring, nasal congestion, and sore throat.  She has been seen by a neurologist in the past at [facility].  She does wear hearing aids and has had a recent eye exam. She has not yet started vestibular rehab. She has not yet started omeprazole.   Today, she admits difficulty with memory and retaining information.    Reviewed:     Study Result    Narrative & Impression   EXAMINATION: CT SINUS WO CONTRAST- 2/10/2025 4:57 PM     HISTORY: nasal congestion, sinus pain and pressure, headache;  R09.81-Nasal congestion; J34.89-Other specified disorders of nose and  nasal sinuses; J34.89-Other specified disorders of nose and nasal  sinuses; R51.9-Headache, unspecified     TOTAL DOSE: 280.4 mGy.cm (Automatic exposure control technique was  implemented in an effort to keep the radiation dose as low as possible  without compromising image quality)     REPORT: Spiral CT of the sinuses was performed without contrast,  reconstructed coronal and sagittal images were also reviewed.     COMPARISON: There are no correlative imaging studies for comparison.     Review of bone windows demonstrates normal aeration of the mastoid air  cells. There is normal aeration of the maxillary, frontal and ethmoid  sinuses. An air-fluid level is present in the left sphenoid sinus with  mild mucosal thickening. This probably represents acute sinusitis.  Coronal images demonstrate deviation of the nasal septum to the left, as  great as 4.7 mm. Morphologically, the ostiomeatal complexes are within  normal limits and are patent, there is mild mucosal thickening  of the  right infundibulum. The orbits and contents are within normal limits.  The mandible and TMJs appear within normal limits. No fracture is  identified.     IMPRESSION:  1. Acute left sphenoid sinusitis, with no complicating factors, no bony  destruction. The other paranasal sinuses are clear. Normal aeration of  the mastoid air cells.  2. Mild deviation of the nasal septum to the left.  3. Normal morphological appearance of the ostiomeatal complexes.           This report was signed and finalized on 2/10/2025 5:00 PM by Dr. Victor M Avendano MD.        Past Medical History:   Diagnosis Date    Allergic rhinitis     Anxiety     Depression     Dizziness 2022    Bad balance    GERD (gastroesophageal reflux disease)     HL (hearing loss) 2000    Hypothyroidism     Mixed hyperlipidemia     Sinusitis 1967    Sleep apnea     Tinnitus 1980       Past Surgical History:   Procedure Laterality Date    BREAST BIOPSY      BUNIONECTOMY      CARDIAC CATHETERIZATION      HERNIA REPAIR      HYSTERECTOMY      KNEE ARTHROSCOPY      NEPHRECTOMY      ROTATOR CUFF REPAIR      TUBAL ABDOMINAL LIGATION         Outpatient Medications Marked as Taking for the 3/13/25 encounter (Office Visit) with Mike Geronimo MD   Medication Sig Dispense Refill    aspirin 81 MG EC tablet TAKE 1 TABLET BY MOUTH PO THREE DAYS PER WEEK      atorvastatin (LIPITOR) 10 MG tablet Daily.      estradiol (ESTRACE) 0.5 MG tablet Take 1 tablet by mouth.      levothyroxine (SYNTHROID, LEVOTHROID) 88 MCG tablet Take 1 tablet by mouth Every Morning.      mometasone (NASONEX) 50 MCG/ACT nasal spray 2 sprays into the nostril(s) as directed by provider 2 (Two) Times a Day. 1 each 2    multivitamin with minerals (therapeutic multivitamin-minerals) tablet tablet Take 1 tablet by mouth.      mupirocin (BACTROBAN) 2 % ointment Please see attached for detailed directions      sertraline (ZOLOFT) 100 MG tablet Take 1.5 tablets by mouth Daily.         Metronidazole,  Ibuprofen, Iodides, Nsaids, Codeine, and Iodinated contrast media    Family History   Problem Relation Age of Onset    Thyroid disease Sister        Social History     Socioeconomic History    Marital status: Single   Tobacco Use    Smoking status: Never    Smokeless tobacco: Never   Vaping Use    Vaping status: Never Used   Substance and Sexual Activity    Alcohol use: Not Currently    Drug use: Never       Review of Systems   HENT:  Positive for congestion and voice change.    Neurological:  Positive for dizziness.       Vitals:    03/13/25 1352   BP: 146/72   Pulse: 83   Temp: 98 °F (36.7 °C)       Body mass index is 21.61 kg/m².    Objective     Physical Exam  Vitals reviewed.   Constitutional:       Appearance: Normal appearance. She is normal weight.   HENT:      Head: Normocephalic.      Right Ear: External ear normal.      Left Ear: External ear normal.      Ears:      Comments: Bilateral hearing aids noted     Nose:      Comments: Nasal dryness noted bilaterally     Mouth/Throat:      Lips: Pink.      Mouth: Mucous membranes are moist.   Musculoskeletal:      Cervical back: Full passive range of motion without pain.   Neurological:      Mental Status: She is alert.   Psychiatric:         Behavior: Behavior is cooperative.         Assessment & Plan   Diagnoses and all orders for this visit:    1. Nasal congestion (Primary)    2. Sinus pain    3. Sinus pressure    4. Snoring    5. Sore throat    6. Laryngopharyngeal reflux (LPR)    7. Globus sensation    Other orders  -     Omeprazole 20 MG tablet delayed-release; Take 20 mg by mouth 2 (Two) Times a Day.  Dispense: 168 each; Refill: 4      * Surgery not found *  No orders of the defined types were placed in this encounter.    Stop pepcid  Omeprazole twice daily before breakfast and dinner  Reflux precautions  Repeat CT sinus in 6-8 months  Recommend neurology f/u  Return for problems      Dr. Geronimo examined and discussed care with patient and agrees with  treatment plan.       Return in about 3 months (around 6/13/2025) for Recheck.       Patient Instructions   Gastroesophageal Reflux Disease (Laryngopharyngeal Reflux), Adult  Gastroesophageal reflux disease (GERD) and/or Laryngopharyngeal Reflux, (LPR) happens when acid from your stomach flows up into the esophagus and/or throat and voicebox or larynx. When acid comes in contact with the these organs, the acid can cause soreness (inflammation). Over time, GERD may create small holes (ulcers) in the lining of the esophagus and may lead to the development of hoarseness, difficulty swallowing,   feeling of something stuck in the throat, increased mucous or drainage and even predispose to the development of malignancies, (cancer).    CAUSES   Increased body weight. This puts pressure on the stomach, making acid rise from the stomach into the esophagus.  Smoking. This increases acid production in the stomach.  Drinking alcohol. This causes decreased pressure in the lower esophageal sphincter (valve or ring of muscle between the esophagus and stomach), allowing acid from the stomach into the esophagus.  Late evening meals and a full stomach. This increases pressure and acid production in the stomach.  A malformed lower esophageal sphincter  Diet which can include avoidance of gluten and dairy products  Age  SYMPTOMS   Burning pain in the lower part of the mid-chest behind the breastbone and in the mid-stomach area. This may occur twice a week or more often.  Trouble swallowing.  Sore throat.  Dry cough.  Asthma-like symptoms including chest tightness, shortness of breath, or wheezing.  Globus sensation-something stuck in the throat/fullness  Hoarseness  DIAGNOSIS   Your caregiver may be able to diagnose GERD based on your symptoms. In some cases, X-rays and other tests may be done to check for complications or to check the condition of your stomach and esophagus.  You may need to see another doctor.  TREATMENT    Over-the-counter or prescription medicines to help decrease acid production.   Dietary and behavioral modifications or changes may be also recommended.  HOME CARE INSTRUCTIONS   Change the factors that you can control. Ask your caregiver for guidance concerning weight loss, quitting smoking, and alcohol consumption.  Avoid foods and drinks that make your symptoms worse, and MAY include such as:  Caffeine or alcoholic drinks.  Chocolate.  Gluten containing foods  Dairy  Peppermint or mint flavorings.  Garlic and onions.  Spicy foods.  Citrus fruits, such as oranges, vi, or limes.  Tomato-based foods such as sauce, chili, salsa, and pizza.  Fried and fatty foods.  Avoid lying down for the 3 hours prior to your bedtime or prior to taking a nap.  Eat small, frequent meals instead of large meals.  Wear loose-fitting clothing. Do not wear anything tight around your waist that causes pressure on your stomach.  Raise the head of your bed 6 to 8 inches with wood blocks to help you sleep. Extra pillows will not help.  Only take over-the-counter or prescription medicines for pain, discomfort, or fever as directed by your caregiver.  Do not take aspirin, ibuprofen, or other nonsteroidal anti-inflammatory drugs if possible (NSAIDs).  SEEK IMMEDIATE MEDICAL CARE IF:   You have pain in your arms, neck, jaw, teeth, or back.  Your pain increases or changes in intensity or duration.  You develop nausea, vomiting, or sweating (diaphoresis).  You develop shortness of breath, or you faint.  Your vomit is green, yellow, black, or looks like coffee grounds or blood.  Your stool is red, bloody, or black.  These symptoms could be signs of other problems, such as heart disease, gastric bleeding, or esophageal bleeding.  MAKE SURE YOU:   Understand these instructions.  Will watch your condition.  Will get help right away if you are not doing well or get worse.     This information is not intended to replace advice given to you by your  physician. Make sure you discuss any questions you have with your health care provider.     Modified by Mike Geronimo MD, FACS 2016.  Document Released: 2006 Document Revised: 2016 Document Reviewed: 2016  Playfish Interactive Patient Education © Elsevier Inc.     CONTACT INFORMATION:  The main office phone number is [phone]. For emergencies after hours and on weekends, this number will convert over to our answering service and the on call provider will answer. Please try to keep non emergent phone calls/ questions to office hours 9am-5pm Monday through Friday.      [facility]  As an alternative, you can sign up and use the Epic MyChart system for more direct and quicker access for non emergent questions/ problems.  [email or web address] allows you to send messages to your doctor, view your test results, renew your prescriptions, schedule appointments, and more. To sign up, go to PageBites and click on the Sign Up Now link in the New User? box. Enter your [facility]ai Activation Code exactly as it appears below along with the last four digits of your Social Security Number and your Date of Birth () to complete the sign-up process. If you do not sign up before the expiration date, you must request a new code.     [facility]cai Activation Code: Activation code not generated  Current [facility]cai Status: Active     If you have questions, you can email [email or web address] or call [phone] to talk to our Moncai staff. Remember, Moncai is NOT to be used for urgent needs. For medical emergencies, dial 911.     IF YOU SMOKE OR USE TOBACCO PLEASE READ THE FOLLOWING:  Why is smoking bad for me?  Smoking increases the risk of heart disease, lung disease, vascular disease, stroke, and cancer. If you smoke, STOP!        IF YOU SMOKE OR USE TOBACCO PLEASE READ THE FOLLOWING:  Why is smoking bad for me?  Smoking increases the risk of heart disease, lung disease, vascular disease,  stroke, and cancer. If you smoke, STOP!     For more information:  Quit Now [place]  1-800-QUIT-NOW  https://[email or web address].quitlogix.org/en-US/

## 2025-03-13 NOTE — PATIENT INSTRUCTIONS
Gastroesophageal Reflux Disease (Laryngopharyngeal Reflux), Adult  Gastroesophageal reflux disease (GERD) and/or Laryngopharyngeal Reflux, (LPR) happens when acid from your stomach flows up into the esophagus and/or throat and voicebox or larynx. When acid comes in contact with the these organs, the acid can cause soreness (inflammation). Over time, GERD may create small holes (ulcers) in the lining of the esophagus and may lead to the development of hoarseness, difficulty swallowing,   feeling of something stuck in the throat, increased mucous or drainage and even predispose to the development of malignancies, (cancer).    CAUSES   Increased body weight. This puts pressure on the stomach, making acid rise from the stomach into the esophagus.  Smoking. This increases acid production in the stomach.  Drinking alcohol. This causes decreased pressure in the lower esophageal sphincter (valve or ring of muscle between the esophagus and stomach), allowing acid from the stomach into the esophagus.  Late evening meals and a full stomach. This increases pressure and acid production in the stomach.  A malformed lower esophageal sphincter  Diet which can include avoidance of gluten and dairy products  Age  SYMPTOMS   Burning pain in the lower part of the mid-chest behind the breastbone and in the mid-stomach area. This may occur twice a week or more often.  Trouble swallowing.  Sore throat.  Dry cough.  Asthma-like symptoms including chest tightness, shortness of breath, or wheezing.  Globus sensation-something stuck in the throat/fullness  Hoarseness  DIAGNOSIS   Your caregiver may be able to diagnose GERD based on your symptoms. In some cases, X-rays and other tests may be done to check for complications or to check the condition of your stomach and esophagus.  You may need to see another doctor.  TREATMENT   Over-the-counter or prescription medicines to help decrease acid production.   Dietary and behavioral modifications  or changes may be also recommended.  HOME CARE INSTRUCTIONS   Change the factors that you can control. Ask your caregiver for guidance concerning weight loss, quitting smoking, and alcohol consumption.  Avoid foods and drinks that make your symptoms worse, and MAY include such as:  Caffeine or alcoholic drinks.  Chocolate.  Gluten containing foods  Dairy  Peppermint or mint flavorings.  Garlic and onions.  Spicy foods.  Citrus fruits, such as oranges, vi, or limes.  Tomato-based foods such as sauce, chili, salsa, and pizza.  Fried and fatty foods.  Avoid lying down for the 3 hours prior to your bedtime or prior to taking a nap.  Eat small, frequent meals instead of large meals.  Wear loose-fitting clothing. Do not wear anything tight around your waist that causes pressure on your stomach.  Raise the head of your bed 6 to 8 inches with wood blocks to help you sleep. Extra pillows will not help.  Only take over-the-counter or prescription medicines for pain, discomfort, or fever as directed by your caregiver.  Do not take aspirin, ibuprofen, or other nonsteroidal anti-inflammatory drugs if possible (NSAIDs).  SEEK IMMEDIATE MEDICAL CARE IF:   You have pain in your arms, neck, jaw, teeth, or back.  Your pain increases or changes in intensity or duration.  You develop nausea, vomiting, or sweating (diaphoresis).  You develop shortness of breath, or you faint.  Your vomit is green, yellow, black, or looks like coffee grounds or blood.  Your stool is red, bloody, or black.  These symptoms could be signs of other problems, such as heart disease, gastric bleeding, or esophageal bleeding.  MAKE SURE YOU:   Understand these instructions.  Will watch your condition.  Will get help right away if you are not doing well or get worse.     This information is not intended to replace advice given to you by your physician. Make sure you discuss any questions you have with your health care provider.     Modified by Mike Geronimo,  MD, LUKASZ 2016.  Document Released: 2006 Document Revised: 2016 Document Reviewed: 2016  Logopro Interactive Patient Education  Elsevier Inc.     CONTACT INFORMATION:  The main office phone number is [phone]. For emergencies after hours and on weekends, this number will convert over to our answering service and the on call provider will answer. Please try to keep non emergent phone calls/ questions to office hours 9am-5pm Monday through Friday.      [facility]  As an alternative, you can sign up and use the Epic MyChart system for more direct and quicker access for non emergent questions/ problems.  RevolutionCredit allows you to send messages to your doctor, view your test results, renew your prescriptions, schedule appointments, and more. To sign up, go to Morizon and click on the Sign Up Now link in the New User? box. Enter your Excellence[employer]u Activation Code exactly as it appears below along with the last four digits of your Social Security Number and your Date of Birth () to complete the sign-up process. If you do not sign up before the expiration date, you must request a new code.     [facility]cell[facility]u Activation Code: Activation code not generated  Current [facility]4u Status: Active     If you have questions, you can email [email or web address] or call [phone] to talk to our Excellence4u staff. Remember, Excellence4u is NOT to be used for urgent needs. For medical emergencies, dial 911.     IF YOU SMOKE OR USE TOBACCO PLEASE READ THE FOLLOWING:  Why is smoking bad for me?  Smoking increases the risk of heart disease, lung disease, vascular disease, stroke, and cancer. If you smoke, STOP!        IF YOU SMOKE OR USE TOBACCO PLEASE READ THE FOLLOWING:  Why is smoking bad for me?  Smoking increases the risk of heart disease, lung disease, vascular disease, stroke, and cancer. If you smoke, STOP!     For more information:  Quit Now  [place]  1-800-QUIT-NOW  https://[place].quitlogix.org/en-US/

## 2025-03-24 ENCOUNTER — TELEPHONE (OUTPATIENT)
Dept: OTOLARYNGOLOGY | Facility: CLINIC | Age: 70
End: 2025-03-24

## 2025-03-24 NOTE — TELEPHONE ENCOUNTER
Provider: MENDOZA ROCK    The [facility] received a fax that requires your attention. The document has been indexed to the patient's chart for your review.     Reason for sending: REVIEW     Documents Description: INITIAL EVALUATION 3/19/25 AND PLAN OF CARE 3/19/25     Name of Sender: BEATRIZ LANDEROS     Date Indexed: 03/24/25    Notes (if needed): INDEXED INTO CHART AS EXT MED RECS 3/19/25 DATE RECEIVED

## 2025-05-08 RX ORDER — TRIAMCINOLONE ACETONIDE 40 MG/ML
40 INJECTION, SUSPENSION INTRA-ARTICULAR; INTRAMUSCULAR ONCE
Status: CANCELLED | OUTPATIENT
Start: 2025-05-13

## 2025-05-08 RX ORDER — BUPIVACAINE HYDROCHLORIDE 5 MG/ML
4 INJECTION, SOLUTION PERINEURAL ONCE
Status: CANCELLED | OUTPATIENT
Start: 2025-05-13

## 2025-05-13 ENCOUNTER — OFFICE VISIT (OUTPATIENT)
Age: 70
End: 2025-05-13
Payer: MEDICARE

## 2025-05-13 VITALS — WEIGHT: 124 LBS | HEIGHT: 63 IN | BODY MASS INDEX: 21.97 KG/M2

## 2025-05-13 DIAGNOSIS — M17.0 BILATERAL PRIMARY OSTEOARTHRITIS OF KNEE: Primary | ICD-10-CM

## 2025-05-13 PROCEDURE — 20610 DRAIN/INJ JOINT/BURSA W/O US: CPT | Performed by: ORTHOPAEDIC SURGERY

## 2025-05-13 RX ORDER — TRIAMCINOLONE ACETONIDE 40 MG/ML
40 INJECTION, SUSPENSION INTRA-ARTICULAR; INTRAMUSCULAR ONCE
Status: COMPLETED | OUTPATIENT
Start: 2025-05-13 | End: 2025-05-13

## 2025-05-13 RX ORDER — BUPIVACAINE HYDROCHLORIDE 5 MG/ML
4 INJECTION, SOLUTION PERINEURAL ONCE
Status: COMPLETED | OUTPATIENT
Start: 2025-05-13 | End: 2025-05-13

## 2025-05-13 RX ADMIN — TRIAMCINOLONE ACETONIDE 40 MG: 40 INJECTION, SUSPENSION INTRA-ARTICULAR; INTRAMUSCULAR at 09:44

## 2025-05-13 RX ADMIN — BUPIVACAINE HYDROCHLORIDE 20 MG: 5 INJECTION, SOLUTION PERINEURAL at 09:43

## 2025-05-13 RX ADMIN — TRIAMCINOLONE ACETONIDE 40 MG: 40 INJECTION, SUSPENSION INTRA-ARTICULAR; INTRAMUSCULAR at 09:45

## 2025-05-13 RX ADMIN — BUPIVACAINE HYDROCHLORIDE 20 MG: 5 INJECTION, SOLUTION PERINEURAL at 09:44

## 2025-05-13 ASSESSMENT — ENCOUNTER SYMPTOMS
RESPIRATORY NEGATIVE: 1
GASTROINTESTINAL NEGATIVE: 1
ALLERGIC/IMMUNOLOGIC NEGATIVE: 1
EYES NEGATIVE: 1

## 2025-05-13 NOTE — PROGRESS NOTES
patellofemoral crepitus is present on the left and an equal amount on the right. No patellar apprehension or instability is noted. Mild effusion is present on the right, but no obvious effusion on the left and there is no swelling noted. There is no redness, warmth, or streaking appreciated. The skin is intact. Sensation is intact distally. Muscle compartments are soft and compressible. The patient ambulates with a nonantalgic gait.                 An electronic signature was used to authenticate this note.    --Андрей Glover MD   The patient (or guardian, if applicable) and other individuals in attendance with the patient were advised that Artificial Intelligence will be utilized during this visit to record, process the conversation to generate a clinical note, and support improvement of the AI technology. The patient (or guardian, if applicable) and other individuals in attendance at the appointment consented to the use of AI, including the recording.

## 2025-05-19 ENCOUNTER — TELEPHONE (OUTPATIENT)
Age: 70
End: 2025-05-19

## 2025-05-19 NOTE — TELEPHONE ENCOUNTER
Patient received letter stating injection not covered by Medicare part D.  She has [employer] Medicare and Tonny was approved.  She stated she would wait for call regarding appointment.

## 2025-06-09 ENCOUNTER — TELEPHONE (OUTPATIENT)
Age: 70
End: 2025-06-09

## 2025-06-16 ENCOUNTER — OFFICE VISIT (OUTPATIENT)
Dept: OTOLARYNGOLOGY | Facility: CLINIC | Age: 70
End: 2025-06-16
Payer: MEDICARE

## 2025-06-16 VITALS
WEIGHT: 125 LBS | BODY MASS INDEX: 22.15 KG/M2 | SYSTOLIC BLOOD PRESSURE: 116 MMHG | HEIGHT: 63 IN | HEART RATE: 82 BPM | TEMPERATURE: 98.4 F | DIASTOLIC BLOOD PRESSURE: 81 MMHG

## 2025-06-16 DIAGNOSIS — J34.89 SINUS PRESSURE: ICD-10-CM

## 2025-06-16 DIAGNOSIS — R06.83 SNORING: ICD-10-CM

## 2025-06-16 DIAGNOSIS — R42 DIZZINESS: ICD-10-CM

## 2025-06-16 DIAGNOSIS — K21.9 LARYNGOPHARYNGEAL REFLUX (LPR): ICD-10-CM

## 2025-06-16 DIAGNOSIS — J34.89 SINUS PAIN: ICD-10-CM

## 2025-06-16 DIAGNOSIS — R09.81 NASAL CONGESTION: Primary | ICD-10-CM

## 2025-06-16 DIAGNOSIS — J02.9 SORE THROAT: ICD-10-CM

## 2025-06-16 RX ORDER — FAMOTIDINE 40 MG/1
40 TABLET, FILM COATED ORAL
COMMUNITY

## 2025-06-16 RX ORDER — AZELASTINE 1 MG/ML
2 SPRAY, METERED NASAL 2 TIMES DAILY
Qty: 30 ML | Refills: 11 | Status: SHIPPED | OUTPATIENT
Start: 2025-06-16 | End: 2025-06-16

## 2025-06-16 RX ORDER — PERFLUOROHEXYLOCTANE 1 MG/MG
SOLUTION OPHTHALMIC
COMMUNITY
Start: 2025-05-27

## 2025-06-16 RX ORDER — AZELASTINE 1 MG/ML
2 SPRAY, METERED NASAL 2 TIMES DAILY
Qty: 30 ML | Refills: 11 | Status: SHIPPED | OUTPATIENT
Start: 2025-06-16

## 2025-06-16 NOTE — PATIENT INSTRUCTIONS
CONTACT INFORMATION:  The main office phone number is [phone]. For emergencies after hours and on weekends, this number will convert over to our answering service and the on call provider will answer. Please try to keep non emergent phone calls/ questions to office hours 9am-5pm Monday through Friday.      [facility]  As an alternative, you can sign up and use the Epic MyChart system for more direct and quicker access for non emergent questions/ problems.  Aphios allows you to send messages to your doctor, view your test results, renew your prescriptions, schedule appointments, and more. To sign up, go to Nimbus Cloud Apps and click on the Sign Up Now link in the New User? box. Enter your Autocosta Activation Code exactly as it appears below along with the last four digits of your Social Security Number and your Date of Birth () to complete the sign-up process. If you do not sign up before the expiration date, you must request a new code.     [facility]ocosta Activation Code: Activation code not generated  Current Autocosta Status: Active     If you have questions, you can email [email or web address] or call [phone] to talk to our [facility]ocosta staff. Remember, [facility] is NOT to be used for urgent needs. For medical emergencies, dial 911.     IF YOU SMOKE OR USE TOBACCO PLEASE READ THE FOLLOWING:  Why is smoking bad for me?  Smoking increases the risk of heart disease, lung disease, vascular disease, stroke, and cancer. If you smoke, STOP!        IF YOU SMOKE OR USE TOBACCO PLEASE READ THE FOLLOWING:  Why is smoking bad for me?  Smoking increases the risk of heart disease, lung disease, vascular disease, stroke, and cancer. If you smoke, STOP!     For more information:  Quit Now [place]  -QUIT-NOW  https://[email or web address].[email or web address]/en-US/

## 2025-06-16 NOTE — PROGRESS NOTES
YOB: 1955  Location: [facility]  Location Address: [address] [place] [address],  [address], [address] [place] [address]  Location Phone: [phone]    Chief Complaint   Patient presents with    Nasal Congestion    LPR    Ear Fullness     Has been using nasonex for years. Does not feel like it clears everything ears feel clogged.        History of Present Illness  Dayana Ford is a 69 y.o. female.  Dayana Ford is here for follow up of ENT complaints. The patient has had problems with nasal congestion, globus sensation, dizziness, and sore throat. She has been taking pepcid twice daily before breakfast and dinner. She is taking nasonex daily. Vestibular rehab was started but was discontinued due to cost. She completed three visits. She states that she is going to start home exercises.She does admit increased off balanceness.     Today, she also admits dysphagia. She has not had a swallow study.     Past Medical History:   Diagnosis Date    Allergic rhinitis     Anxiety     Depression     Dizziness     Bad balance    GERD (gastroesophageal reflux disease)     HL (hearing loss)     Hypothyroidism     Mixed hyperlipidemia     Sinusitis     Sleep apnea     Tinnitus        Past Surgical History:   Procedure Laterality Date    BREAST BIOPSY      BUNIONECTOMY      CARDIAC CATHETERIZATION      HERNIA REPAIR      HYSTERECTOMY      KNEE ARTHROSCOPY      NEPHRECTOMY      ROTATOR CUFF REPAIR      TUBAL ABDOMINAL LIGATION         Outpatient Medications Marked as Taking for the 25 encounter (Office Visit) with Varinder Owens APRN   Medication Sig Dispense Refill    aspirin 81 MG EC tablet TAKE 1 TABLET BY MOUTH PO THREE DAYS PER WEEK      atorvastatin (LIPITOR) 10 MG tablet Daily.      estradiol (ESTRACE) 0.5 MG tablet Take 1 tablet by mouth.      famotidine (PEPCID) 40 MG tablet Take 1 tablet by mouth.      levothyroxine (SYNTHROID, LEVOTHROID) 88 MCG tablet Take 1 tablet by mouth Every Morning.       Miebo 1.338 GM/ML solution       mometasone (NASONEX) 50 MCG/ACT nasal spray 2 sprays into the nostril(s) as directed by provider 2 (Two) Times a Day. 1 each 2    multivitamin with minerals (therapeutic multivitamin-minerals) tablet tablet Take 1 tablet by mouth.      sertraline (ZOLOFT) 100 MG tablet Take 1.5 tablets by mouth Daily.         Metronidazole, Ibuprofen, Iodides, Nsaids, Codeine, and Iodinated contrast media    Family History   Problem Relation Age of Onset    Thyroid disease Sister        Social History     Socioeconomic History    Marital status: Single   Tobacco Use    Smoking status: Never    Smokeless tobacco: Never   Vaping Use    Vaping status: Never Used   Substance and Sexual Activity    Alcohol use: Not Currently    Drug use: Never       Review of Systems   Constitutional: Negative.    HENT:  Positive for congestion and sore throat.    Neurological:  Positive for dizziness.       Vitals:    06/16/25 1352   BP: 116/81   Pulse: 82   Temp: 98.4 °F (36.9 °C)       Body mass index is 22.14 kg/m².    Objective     Physical Exam  Vitals reviewed.   Constitutional:       Appearance: Normal appearance. She is normal weight.   HENT:      Head: Normocephalic.      Right Ear: External ear normal.      Left Ear: External ear normal.      Ears:      Comments: Bilateral hearing aids noted     Mouth/Throat:      Lips: Pink.      Mouth: Mucous membranes are moist.      Pharynx: Oropharynx is clear.   Musculoskeletal:      Cervical back: Full passive range of motion without pain.   Neurological:      Mental Status: She is alert.   Psychiatric:         Behavior: Behavior is cooperative.         Assessment & Plan   Diagnoses and all orders for this visit:    1. Nasal congestion (Primary)  -     CT Sinus Without Contrast; Future    2. Sinus pain  -     CT Sinus Without Contrast; Future    3. Sinus pressure  -     CT Sinus Without Contrast; Future    4. Snoring  -     CT Sinus Without Contrast; Future    5. Sore  throat    6. Laryngopharyngeal reflux (LPR)    7. Dizziness  -     Cancel: Ambulatory Referral to Neurology  -     Ambulatory Referral to Neurology    Other orders  -     Discontinue: azelastine (ASTELIN) 0.1 % nasal spray; Administer 2 sprays into the nostril(s) as directed by provider 2 (Two) Times a Day. Use in each nostril as directed  Dispense: 30 mL; Refill: 11  -     azelastine (ASTELIN) 0.1 % nasal spray; Administer 2 sprays into the nostril(s) as directed by provider 2 (Two) Times a Day. Use in each nostril as directed  Dispense: 30 mL; Refill: 11      * Surgery not found *  Orders Placed This Encounter   Procedures    CT Sinus Without Contrast     Standing Status:   Future     Expected Date:   9/16/2025     Expiration Date:   10/16/2026     Scheduling Instructions:      Stealth -  image guidance     Release to patient:   Routine Release [[id number]]     Reason for Exam::   sinus pain, sinus pressure, nasal congestion    Ambulatory Referral to Neurology     Referral Priority:   Routine     Referral Type:   Consultation     Referral Reason:   Specialty Services Required     Requested Specialty:   Neurology     Number of Visits Requested:   1     Defers dysphagia study today but she will call if she decides to proceed  CT sinus in 3-6 months  Referral to neurology  Add astelin nasal spray  Return for problems    Return in about 5 months (around 11/16/2025) for Recheck, CT with Dr. Geronimo.       Patient Instructions   CONTACT INFORMATION:  The main office phone number is [phone]. For emergencies after hours and on weekends, this number will convert over to our answering service and the on call provider will answer. Please try to keep non emergent phone calls/ questions to office hours 9am-5pm Monday through Friday.      [employer]  As an alternative, you can sign up and use the Epic MyChart system for more direct and quicker access for non emergent questions/ problems.  [facility] [employer] allows you to  send messages to your doctor, view your test results, renew your prescriptions, schedule appointments, and more. To sign up, go to [email or web address] and click on the Sign Up Now link in the New User? box. Enter your [employer] Activation Code exactly as it appears below along with the last four digits of your Social Security Number and your Date of Birth () to complete the sign-up process. If you do not sign up before the expiration date, you must request a new code.     [employer] Activation Code: Activation code not generated  Current [employer] Status: Active     If you have questions, you can email [email or web address] or call [phone] to talk to our [employer] staff. Remember, [employer] is NOT to be used for urgent needs. For medical emergencies, dial 911.     IF YOU SMOKE OR USE TOBACCO PLEASE READ THE FOLLOWING:  Why is smoking bad for me?  Smoking increases the risk of heart disease, lung disease, vascular disease, stroke, and cancer. If you smoke, STOP!        IF YOU SMOKE OR USE TOBACCO PLEASE READ THE FOLLOWING:  Why is smoking bad for me?  Smoking increases the risk of heart disease, lung disease, vascular disease, stroke, and cancer. If you smoke, STOP!     For more information:  Quit Now [place]  -QUIT-NOW  [email or web address]

## 2025-06-25 ENCOUNTER — OFFICE VISIT (OUTPATIENT)
Age: 70
End: 2025-06-25
Payer: MEDICARE

## 2025-06-25 VITALS — WEIGHT: 123.6 LBS | HEIGHT: 63 IN | BODY MASS INDEX: 21.9 KG/M2

## 2025-06-25 DIAGNOSIS — M17.0 BILATERAL PRIMARY OSTEOARTHRITIS OF KNEE: Primary | ICD-10-CM

## 2025-06-25 PROCEDURE — 20610 DRAIN/INJ JOINT/BURSA W/O US: CPT | Performed by: ORTHOPAEDIC SURGERY

## 2025-06-25 PROCEDURE — 99214 OFFICE O/P EST MOD 30 MIN: CPT | Performed by: ORTHOPAEDIC SURGERY

## 2025-06-25 PROCEDURE — 1123F ACP DISCUSS/DSCN MKR DOCD: CPT | Performed by: ORTHOPAEDIC SURGERY

## 2025-06-25 RX ORDER — BUPIVACAINE HYDROCHLORIDE 2.5 MG/ML
4 INJECTION, SOLUTION INFILTRATION; PERINEURAL ONCE
Status: COMPLETED | OUTPATIENT
Start: 2025-06-25 | End: 2025-06-25

## 2025-06-25 RX ADMIN — BUPIVACAINE HYDROCHLORIDE 10 MG: 2.5 INJECTION, SOLUTION INFILTRATION; PERINEURAL at 13:05

## 2025-06-25 RX ADMIN — BUPIVACAINE HYDROCHLORIDE 10 MG: 2.5 INJECTION, SOLUTION INFILTRATION; PERINEURAL at 13:04

## 2025-06-25 ASSESSMENT — ENCOUNTER SYMPTOMS
RESPIRATORY NEGATIVE: 1
EYES NEGATIVE: 1
GASTROINTESTINAL NEGATIVE: 1
ALLERGIC/IMMUNOLOGIC NEGATIVE: 1

## 2025-06-25 NOTE — PROGRESS NOTES
[facility]  [facility]  [address]  [facility] [address]  [phone]       Malena Anguiano (:  1955) is a 69 y.o. female,Established patient, here for evaluation of the following chief complaint(s):  Injections (Bilateral knee)        Assessment & Plan  1. Bilateral knee pain:  Symptoms in both knees have worsened, with the left knee experiencing frequent cracking and the right knee feeling thick. Muscle pain around the knees is noted, particularly after prolonged standing and lifting at work.  As a result, the patient has elected to attempt hyaluronic acid injection therapy.    The risks and benefits of the procedure were explained to the patient including the risk for infection, nerve and artery damage, continued pain, continued decreased range of motion, paresthesias, paralysis, loss of limb, loss of life and the need for surgery, and the patient conveyed their understanding and willingness to proceed.  Under aseptic technique, the bilateral knees were injected using a 21-gauge needle with a solution of 60 mg / 3 cc of Durolane hyaluronic acid and 4 cc of half percent bupivacaine local anesthetic.  The patient tolerated the procedure very well.  It is explained to the patient that this injection may be repeated as frequently as every 6 months or less frequently if their symptoms will allow.  She conveyed her understanding.Durolane injections were administered to both knees. The patient was informed that Durolane's effects might take longer to manifest compared to steroid injections but should provide longer-lasting relief. Local anesthetic was injected to provide acute relief. Advised to avoid activities that exacerbate knee pain, such as prolonged standing on concrete and heavy lifting. 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 Discussed the importance of rest and elevation of the legs after strenuous

## 2025-08-12 ENCOUNTER — OFFICE VISIT (OUTPATIENT)
Dept: NEUROLOGY | Age: 70
End: 2025-08-12
Payer: MEDICARE

## 2025-08-12 VITALS
HEART RATE: 68 BPM | HEIGHT: 63 IN | DIASTOLIC BLOOD PRESSURE: 87 MMHG | SYSTOLIC BLOOD PRESSURE: 127 MMHG | WEIGHT: 123 LBS | OXYGEN SATURATION: 98 % | BODY MASS INDEX: 21.79 KG/M2

## 2025-08-12 DIAGNOSIS — R26.89 IMBALANCE: ICD-10-CM

## 2025-08-12 DIAGNOSIS — R41.3 MEMORY LOSS: ICD-10-CM

## 2025-08-12 DIAGNOSIS — R53.83 OTHER FATIGUE: ICD-10-CM

## 2025-08-12 DIAGNOSIS — E55.9 VITAMIN D DEFICIENCY: ICD-10-CM

## 2025-08-12 DIAGNOSIS — R42 DIZZINESS: Primary | ICD-10-CM

## 2025-08-12 LAB
25(OH)D3 SERPL-MCNC: 51 NG/ML
ALBUMIN SERPL-MCNC: 4.3 G/DL (ref 3.5–5.2)
ALP SERPL-CCNC: 79 U/L (ref 35–104)
ALT SERPL-CCNC: 17 U/L (ref 10–35)
ANION GAP SERPL CALCULATED.3IONS-SCNC: 9 MMOL/L (ref 8–16)
AST SERPL-CCNC: 23 U/L (ref 10–35)
BASOPHILS # BLD: 0 K/UL (ref 0–0.2)
BASOPHILS NFR BLD: 0.4 % (ref 0–1)
BILIRUB SERPL-MCNC: 0.5 MG/DL (ref 0.2–1.2)
BUN SERPL-MCNC: 15 MG/DL (ref 8–23)
CALCIUM SERPL-MCNC: 10.1 MG/DL (ref 8.8–10.2)
CHLORIDE SERPL-SCNC: 102 MMOL/L (ref 98–107)
CO2 SERPL-SCNC: 30 MMOL/L (ref 22–29)
CREAT SERPL-MCNC: 1 MG/DL (ref 0.5–0.9)
CRP SERPL-MCNC: <3 MG/L (ref 0–5)
EOSINOPHIL # BLD: 0.1 K/UL (ref 0–0.6)
EOSINOPHIL NFR BLD: 1 % (ref 0–5)
ERYTHROCYTE [DISTWIDTH] IN BLOOD BY AUTOMATED COUNT: 13.2 % (ref 11.5–14.5)
GLUCOSE SERPL-MCNC: 82 MG/DL (ref 70–99)
HCT VFR BLD AUTO: 43.7 % (ref 37–47)
HGB BLD-MCNC: 14.3 G/DL (ref 12–16)
HIV-1 P24 AG: NORMAL
HIV1+2 AB SERPLBLD QL IA.RAPID: NORMAL
IMM GRANULOCYTES # BLD: 0 K/UL
LYMPHOCYTES # BLD: 1.6 K/UL (ref 1.1–4.5)
LYMPHOCYTES NFR BLD: 20.3 % (ref 20–40)
MCH RBC QN AUTO: 31.2 PG (ref 27–31)
MCHC RBC AUTO-ENTMCNC: 32.7 G/DL (ref 33–37)
MCV RBC AUTO: 95.2 FL (ref 81–99)
MONOCYTES # BLD: 0.6 K/UL (ref 0–0.9)
MONOCYTES NFR BLD: 7.3 % (ref 0–10)
NEUTROPHILS # BLD: 5.6 K/UL (ref 1.5–7.5)
NEUTS SEG NFR BLD: 70.7 % (ref 50–65)
PLATELET # BLD AUTO: 207 K/UL (ref 130–400)
PMV BLD AUTO: 10.5 FL (ref 9.4–12.3)
POTASSIUM SERPL-SCNC: 4.8 MMOL/L (ref 3.5–5.1)
PROT SERPL-MCNC: 6.8 G/DL (ref 6.4–8.3)
RBC # BLD AUTO: 4.59 M/UL (ref 4.2–5.4)
SODIUM SERPL-SCNC: 141 MMOL/L (ref 136–145)
TSH SERPL DL<=0.005 MIU/L-ACNC: 0.81 UIU/ML (ref 0.27–4.2)
VIT B12 SERPL-MCNC: 381 PG/ML (ref 232–1245)
WBC # BLD AUTO: 7.8 K/UL (ref 4.8–10.8)

## 2025-08-12 PROCEDURE — 99204 OFFICE O/P NEW MOD 45 MIN: CPT | Performed by: NURSE PRACTITIONER

## 2025-08-12 PROCEDURE — 1123F ACP DISCUSS/DSCN MKR DOCD: CPT | Performed by: NURSE PRACTITIONER

## 2025-08-12 RX ORDER — OMEPRAZOLE 20 MG/1
20 CAPSULE, DELAYED RELEASE ORAL 2 TIMES DAILY
COMMUNITY
Start: 2025-05-29

## 2025-08-14 ENCOUNTER — HOSPITAL ENCOUNTER (OUTPATIENT)
Dept: MRI IMAGING | Age: 70
Discharge: HOME | End: 2025-08-14
Payer: MEDICARE

## 2025-08-14 ENCOUNTER — HOSPITAL ENCOUNTER (OUTPATIENT)
Dept: VASCULAR LAB | Age: 70
End: 2025-08-14
Payer: MEDICARE

## 2025-08-14 DIAGNOSIS — R42 DIZZINESS: ICD-10-CM

## 2025-08-14 DIAGNOSIS — R41.3 MEMORY LOSS: ICD-10-CM

## 2025-08-14 LAB
AL BETA40 PLAS-MCNC: 248.34 PG/ML
AL BETA42 PLAS-MCNC: 27.35 PG/ML
BETA-AMYLOID 42/40 RATIO: 0.11
RPR SER QL: NORMAL

## 2025-08-14 PROCEDURE — 70544 MR ANGIOGRAPHY HEAD W/O DYE: CPT

## 2025-08-14 PROCEDURE — 70551 MRI BRAIN STEM W/O DYE: CPT

## 2025-08-14 PROCEDURE — 93880 EXTRACRANIAL BILAT STUDY: CPT

## 2025-08-15 LAB
ARSENIC BLD-MCNC: <10 UG/L
CADMIUM BLD-MCNC: <1 UG/L
LEAD BLD-MCNC: <2 UG/DL
MERCURY BLD-MCNC: <2.5 UG/L
VIT B1 BLD-MCNC: 145 NMOL/L (ref 70–180)

## 2025-08-16 LAB
VAS LEFT ARM BP DIA: 80 MMHG
VAS LEFT ARM BP: 140 MMHG
VAS LEFT CCA MID EDV: 18.9 CM/S
VAS LEFT CCA MID PSV: 64.8 CM/S
VAS LEFT CCA PROX EDV: 14.9 CM/S
VAS LEFT CCA PROX PSV: 62.1 CM/S
VAS LEFT ECA EDV: 9.04 CM/S
VAS LEFT ECA PSV: 52.6 CM/S
VAS LEFT ICA DIST EDV: 26.4 CM/S
VAS LEFT ICA DIST PSV: 72.1 CM/S
VAS LEFT ICA MID EDV: 25.5 CM/S
VAS LEFT ICA MID PSV: 74.6 CM/S
VAS LEFT ICA PROX EDV: 11 CM/S
VAS LEFT ICA PROX PSV: 42.8 CM/S
VAS LEFT VERTEBRAL EDV: 11.8 CM/S
VAS LEFT VERTEBRAL PSV: 42.8 CM/S
VAS RIGHT ARM BP DIA: 82 MMHG
VAS RIGHT ARM BP: 140 MMHG
VAS RIGHT CCA MID EDV: 23.5 CM/S
VAS RIGHT CCA MID PSV: 84.4 CM/S
VAS RIGHT CCA PROX EDV: 18.2 CM/S
VAS RIGHT CCA PROX PSV: 84.4 CM/S
VAS RIGHT ECA EDV: 10.6 CM/S
VAS RIGHT ECA PSV: 55 CM/S
VAS RIGHT ICA DIST EDV: 23.6 CM/S
VAS RIGHT ICA DIST PSV: 67.6 CM/S
VAS RIGHT ICA MID EDV: 23.6 CM/S
VAS RIGHT ICA MID PSV: 69.9 CM/S
VAS RIGHT ICA PROX EDV: 17.3 CM/S
VAS RIGHT ICA PROX PSV: 67.2 CM/S
VAS RIGHT VERTEBRAL EDV: 14.9 CM/S
VAS RIGHT VERTEBRAL PSV: 46 CM/S

## 2025-08-18 LAB — P-TAU217: NORMAL

## 2025-08-19 LAB
APOE ALLELE E2+E3+E4 BLD/T: NORMAL
SPECIMEN SOURCE: NORMAL

## (undated) DEVICE — CIRCUIT AD PLN SWVL WYE

## (undated) DEVICE — NEEDLE SUT PASS FOR ROT CUF LABRAL REP SUREFIRE SCORPION

## (undated) DEVICE — TUBING, SUCTION, 1/4" X 20', STRAIGHT: Brand: MEDLINE INDUSTRIES, INC.

## (undated) DEVICE — SURGICAL PROCEDURE PACK LOWER EXTREMITY LOURDES HOSP

## (undated) DEVICE — TOWEL,OR,DSP,ST,BLUE,DLX,4/PK,20PK/CS: Brand: MEDLINE

## (undated) DEVICE — DRAPE,EXTREMITY,89X128,STERILE: Brand: MEDLINE

## (undated) DEVICE — SUTURE MCRYL SZ 3 0 L18IN ABSRB UD PS 2 3 8 CIR REV CUT NDL MCP497G

## (undated) DEVICE — 3M™ TEGADERM™ TRANSPARENT FILM DRESSING FRAME STYLE, 1626W, 4 IN X 4-3/4 IN (10 CM X 12 CM), 50/CT 4CT/CASE: Brand: 3M™ TEGADERM™

## (undated) DEVICE — ANES CIRC 60 CORR-LF: Brand: MEDLINE INDUSTRIES, INC.

## (undated) DEVICE — C-ARM: Brand: UNBRANDED

## (undated) DEVICE — MAJOR BSIN SETUP PK

## (undated) DEVICE — ADHESIVE SKIN CLSR 0.7ML TOP DERMBND ADV

## (undated) DEVICE — GLOVE SURG SZ 85 L12IN FNGR THK87MIL DK GRN LTX FREE ISOLEX

## (undated) DEVICE — 3.5 MM FULL RADIUS ELITE STRAIGHT                                    DISPOSABLE BLADES, BEIGE,PACKAGED 6                                    PER BOX, STERILE

## (undated) DEVICE — PAD,EYE,1-5/8X2 5/8,STERILE,LF,1/PK: Brand: MEDLINE

## (undated) DEVICE — CHLORAPREP 26ML ORANGE

## (undated) DEVICE — TUBING PMP IRRIG GOFLO

## (undated) DEVICE — SOLUTION IRRIG 3000ML 0.9% SOD CHL USP UROMATIC PLAS CONT

## (undated) DEVICE — SUTURE MCRYL SZ 4-0 L18IN ABSRB UD L19MM PS-2 3/8 CIR PRIM Y496G

## (undated) DEVICE — SHOULDER CDS

## (undated) DEVICE — WRAP AROUND LENS-STERLIE

## (undated) DEVICE — ARM BOARD PAD: Brand: DEVON

## (undated) DEVICE — DRAPE,U/ SHT,SPLIT,PLAS,STERIL: Brand: MEDLINE

## (undated) DEVICE — GLOVE ORANGE PI 8   MSG9080

## (undated) DEVICE — CANNULA ARTHSCP L4CM DIA8MM PASSPRT BTTN

## (undated) DEVICE — GLOVE SURG SZ 75 CRM LTX FREE POLYISOPRENE POLYMER BEAD ANTI

## (undated) DEVICE — SUTURE VCRL SZ 4-0 L18IN ABSRB UD L19MM PS-2 3/8 CIR PRIM J496H

## (undated) DEVICE — 5.5 MM INCISOR PLUS STRAIGHT                                    BLADES, POWER/EP-1, TEAL, PACKAGED 6                                    PER BOX, STERILE

## (undated) DEVICE — INTENDED FOR TISSUE SEPARATION, AND OTHER PROCEDURES THAT REQUIRE A SHARP SURGICAL BLADE TO PUNCTURE OR CUT.: Brand: BARD-PARKER ® CARBON RIB-BACK BLADES

## (undated) DEVICE — ZIMMER® STERILE DISPOSABLE TOURNIQUET CUFF WITH PLC, DUAL PORT, SINGLE BLADDER, 34 IN. (86 CM)

## (undated) DEVICE — 3M™ STERI-STRIP™ REINFORCED ADHESIVE SKIN CLOSURES, R1547, 1/2 IN X 4 IN (12 MM X 100 MM), 6 STRIPS/ENVELOPE: Brand: 3M™ STERI-STRIP™

## (undated) DEVICE — MASK ANES AD M SZ 5 PREM TAIL VLV INFL PRT UNSCENTED HK RNG

## (undated) DEVICE — 5.5 CM ACROMIOBLASTER STRAIGHT                                    BURRS, POWER/EP-1, BRICK RED, 8000                                    MAXIMUM RPM, PACKAGED 6 PER BOX, STERILE

## (undated) DEVICE — DRESSING PETRO W2XL2IN 100% USP COT GZ 3% BISMUTH

## (undated) DEVICE — KIT SHLDR STBL MARCO FOR SPIDER LIMB POS

## (undated) DEVICE — GLOVE SURG SZ 85 L12IN FNGR THK94MIL TRNSLUC YEL LTX

## (undated) DEVICE — 9165 UNIVERSAL PATIENT PLATE: Brand: 3M™

## (undated) DEVICE — ELECTROSURGICAL PENCIL BUTTON SWITCH NON COATED BLADE ELECTRODE 10 FT (3 M) CORD HOLSTER: Brand: MEGADYNE

## (undated) DEVICE — 90-S MAX, SUCTION PROBE, NON-BENDABLE, MAX CUT LEVEL 11: Brand: SERFAS ENERGY

## (undated) DEVICE — GLOVE SURG SZ 9 L12IN FNGR THK13MIL BRN LTX SYN POLYMER W

## (undated) DEVICE — INTENDED TO SUPPORT AND MAINTAIN THE POSITION OF AN ANESTHETIZED PATIENT DURING SURGERY: Brand: ERIN BEACH CHAIR FACE MASK

## (undated) DEVICE — Z INACTIVE USE 2660664 SOLUTION IRRIG 3000ML 0.9% SOD CHL USP UROMATIC PLAS CONT

## (undated) DEVICE — SHEET,DRAPE,53X77,STERILE: Brand: MEDLINE

## (undated) DEVICE — MEDI-VAC NON-CONDUCTIVE SUCTION TUBING 6MM X 6.1M (20 FT.) L: Brand: CARDINAL HEALTH

## (undated) DEVICE — BANDAGE COMPR W3INXL15FT BGE E SGL LAYERED CLP CLSR

## (undated) DEVICE — DRAPE,SHOULDER,BEACH CHAIR,STERILE: Brand: MEDLINE